# Patient Record
Sex: MALE | HISPANIC OR LATINO | Employment: FULL TIME | URBAN - METROPOLITAN AREA
[De-identification: names, ages, dates, MRNs, and addresses within clinical notes are randomized per-mention and may not be internally consistent; named-entity substitution may affect disease eponyms.]

---

## 2019-08-19 ENCOUNTER — OFFICE VISIT (OUTPATIENT)
Dept: URGENT CARE | Facility: PHYSICIAN GROUP | Age: 24
End: 2019-08-19
Payer: COMMERCIAL

## 2019-08-19 VITALS
HEART RATE: 82 BPM | HEIGHT: 67 IN | TEMPERATURE: 96.6 F | BODY MASS INDEX: 37.67 KG/M2 | OXYGEN SATURATION: 96 % | SYSTOLIC BLOOD PRESSURE: 122 MMHG | RESPIRATION RATE: 18 BRPM | WEIGHT: 240 LBS | DIASTOLIC BLOOD PRESSURE: 84 MMHG

## 2019-08-19 DIAGNOSIS — H10.31 ACUTE BACTERIAL CONJUNCTIVITIS OF RIGHT EYE: ICD-10-CM

## 2019-08-19 PROCEDURE — 99204 OFFICE O/P NEW MOD 45 MIN: CPT | Performed by: NURSE PRACTITIONER

## 2019-08-19 RX ORDER — POLYMYXIN B SULFATE AND TRIMETHOPRIM 1; 10000 MG/ML; [USP'U]/ML
1 SOLUTION OPHTHALMIC 4 TIMES DAILY
Qty: 1 BOTTLE | Refills: 0 | Status: SHIPPED | OUTPATIENT
Start: 2019-08-19 | End: 2019-10-17

## 2019-08-19 NOTE — PROGRESS NOTES
"Subjective:      Atul Michelle is a 23 y.o. male who presents with Redness Or Discharge From Eye (R eye [id number] )            HPI  C/o right eye redness, dryness. Sensation of something in eye. Denies outdoors or wood work. Denies vision. Rinsed with water the eye was watering.     PMH:  has no past medical history on file.  MEDS: No current outpatient medications on file.  ALLERGIES: No Known Allergies  SURGHX: No past surgical history on file.  SOCHX:  reports that he has never smoked. He has never used smokeless tobacco. He reports that he drinks alcohol.  FH: Family history was reviewed, no pertinent findings to report    Review of Systems   Constitutional: Negative for chills, fever and malaise/fatigue.   HENT: Negative for congestion, ear pain, sinus pain and sore throat.    Eyes: Positive for redness. Negative for blurred vision, double vision, photophobia, pain and discharge.   Respiratory: Negative for cough.    Musculoskeletal: Negative for myalgias.   Skin: Negative for itching and rash.   Neurological: Negative for dizziness, weakness and headaches.   Endo/Heme/Allergies: Negative for environmental allergies.   All other systems reviewed and are negative.         Objective:     /84   Pulse 82   Temp 35.9 °C (96.6 °F) (Temporal)   Resp 18   Ht 1.702 m (5' 7\")   Wt 108.9 kg (240 lb)   SpO2 96%   BMI 37.59 kg/m²      Physical Exam   Constitutional: He is oriented to person, place, and time. Vital signs are normal. He appears well-developed and well-nourished. He is active and cooperative.  Non-toxic appearance. He does not have a sickly appearance. He does not appear ill. No distress.   HENT:   Head: Normocephalic.   Nose: Nose normal. No mucosal edema, rhinorrhea or sinus tenderness.   Eyes: Pupils are equal, round, and reactive to light. EOM are normal. Right eye exhibits no chemosis, no discharge, no exudate and no hordeolum. No foreign body (Sclera redness, no eye d/c, no " clear tearing, no foreign material in right eye.) present in the right eye. Left eye exhibits no chemosis, no discharge, no exudate and no hordeolum. No foreign body present in the left eye. Right conjunctiva is injected. Right conjunctiva has no hemorrhage. Left conjunctiva is not injected. Left conjunctiva has no hemorrhage.   Neck: Normal range of motion. Neck supple.   Cardiovascular: Normal rate.   Pulmonary/Chest: Effort normal.   Musculoskeletal: Normal range of motion.   Neurological: He is alert and oriented to person, place, and time.   Skin: Skin is warm and dry. He is not diaphoretic.   Vitals reviewed.              Assessment/Plan:     1. Acute bacterial conjunctivitis of right eye    - polymixin-trimethoprim (POLYTRIM) 73238-1.1 UNIT/ML-% Solution; Place 1 Drop in right eye 4 times a day.  Dispense: 1 Bottle; Refill: 0    May use longer acting antihistamine x 7 days  May use cool compresses for any eye swelling  Avoid touching eyes  May clean eyes with mild dilute soap along eyelash line with eyes closed, rinse with plenty of water  May use saline eye rinse or eye irrigation solution prn for eye dryness  Monitor for increase in redness or swelling, vision change, eye pain- need re-evaluation

## 2019-08-22 ASSESSMENT — ENCOUNTER SYMPTOMS
CHILLS: 0
MYALGIAS: 0
WEAKNESS: 0
DOUBLE VISION: 0
EYE PAIN: 0
SINUS PAIN: 0
EYE REDNESS: 1
PHOTOPHOBIA: 0
SORE THROAT: 0
HEADACHES: 0
DIZZINESS: 0
BLURRED VISION: 0
EYE DISCHARGE: 0
COUGH: 0
FEVER: 0

## 2019-10-17 ENCOUNTER — HOSPITAL ENCOUNTER (OUTPATIENT)
Dept: LAB | Facility: MEDICAL CENTER | Age: 24
End: 2019-10-17
Attending: FAMILY MEDICINE
Payer: COMMERCIAL

## 2019-10-17 ENCOUNTER — OFFICE VISIT (OUTPATIENT)
Dept: MEDICAL GROUP | Facility: PHYSICIAN GROUP | Age: 24
End: 2019-10-17
Payer: COMMERCIAL

## 2019-10-17 VITALS
BODY MASS INDEX: 35.63 KG/M2 | HEIGHT: 67 IN | WEIGHT: 227 LBS | RESPIRATION RATE: 18 BRPM | TEMPERATURE: 97 F | SYSTOLIC BLOOD PRESSURE: 120 MMHG | HEART RATE: 66 BPM | OXYGEN SATURATION: 98 % | DIASTOLIC BLOOD PRESSURE: 80 MMHG

## 2019-10-17 DIAGNOSIS — Z13.6 SCREENING FOR CARDIOVASCULAR CONDITION: ICD-10-CM

## 2019-10-17 DIAGNOSIS — Z23 NEED FOR VACCINATION: ICD-10-CM

## 2019-10-17 DIAGNOSIS — Z00.00 WELL ADULT EXAM: ICD-10-CM

## 2019-10-17 DIAGNOSIS — E66.9 OBESITY (BMI 30-39.9): ICD-10-CM

## 2019-10-17 DIAGNOSIS — Z11.3 SCREEN FOR STD (SEXUALLY TRANSMITTED DISEASE): ICD-10-CM

## 2019-10-17 LAB
C TRACH DNA SPEC QL NAA+PROBE: NEGATIVE
CHOLEST SERPL-MCNC: 132 MG/DL (ref 100–199)
FASTING STATUS PATIENT QL REPORTED: NORMAL
HDLC SERPL-MCNC: 29 MG/DL
HIV 1+2 AB+HIV1 P24 AG SERPL QL IA: NON REACTIVE
LDLC SERPL CALC-MCNC: 63 MG/DL
N GONORRHOEA DNA SPEC QL NAA+PROBE: NEGATIVE
SPECIMEN SOURCE: NORMAL
TREPONEMA PALLIDUM IGG+IGM AB [PRESENCE] IN SERUM OR PLASMA BY IMMUNOASSAY: NON REACTIVE
TRIGL SERPL-MCNC: 201 MG/DL (ref 0–149)

## 2019-10-17 PROCEDURE — 99385 PREV VISIT NEW AGE 18-39: CPT | Mod: 25 | Performed by: FAMILY MEDICINE

## 2019-10-17 PROCEDURE — 90472 IMMUNIZATION ADMIN EACH ADD: CPT | Performed by: FAMILY MEDICINE

## 2019-10-17 PROCEDURE — 90651 9VHPV VACCINE 2/3 DOSE IM: CPT | Performed by: FAMILY MEDICINE

## 2019-10-17 PROCEDURE — 86780 TREPONEMA PALLIDUM: CPT

## 2019-10-17 PROCEDURE — 87591 N.GONORRHOEAE DNA AMP PROB: CPT

## 2019-10-17 PROCEDURE — 87491 CHLMYD TRACH DNA AMP PROBE: CPT

## 2019-10-17 PROCEDURE — 90715 TDAP VACCINE 7 YRS/> IM: CPT | Performed by: FAMILY MEDICINE

## 2019-10-17 PROCEDURE — 36415 COLL VENOUS BLD VENIPUNCTURE: CPT

## 2019-10-17 PROCEDURE — 90686 IIV4 VACC NO PRSV 0.5 ML IM: CPT | Performed by: FAMILY MEDICINE

## 2019-10-17 PROCEDURE — 87389 HIV-1 AG W/HIV-1&-2 AB AG IA: CPT

## 2019-10-17 PROCEDURE — 90471 IMMUNIZATION ADMIN: CPT | Performed by: FAMILY MEDICINE

## 2019-10-17 PROCEDURE — 80061 LIPID PANEL: CPT

## 2019-10-17 SDOH — HEALTH STABILITY: MENTAL HEALTH: HOW OFTEN DO YOU HAVE A DRINK CONTAINING ALCOHOL?: 2-4 TIMES A MONTH

## 2019-10-17 ASSESSMENT — PATIENT HEALTH QUESTIONNAIRE - PHQ9: CLINICAL INTERPRETATION OF PHQ2 SCORE: 0

## 2019-10-17 NOTE — PROGRESS NOTES
"cc: annual exam       Subjective:     Atul Michelle is a 23 y.o. male presenting for the following:     Patient feels well today and presents in his normal state of health.    He takes no regular medications.  No hospitalizations or surgeries in his past.  No family history of early death or early heart attack/stroke.  No family history of cancers.    Patient denies any chest pain, shortness of breath, palpitations, swelling, or lightheadedness.      Review of systems:  All others reviewed and are negative.     No current outpatient medications on file.    Allergies, past medical history, past surgical history, family history, social history reviewed and updated    Objective:     Vitals: /80 (BP Location: Right arm, Patient Position: Sitting, BP Cuff Size: Large adult)   Pulse 66   Temp 36.1 °C (97 °F) (Temporal)   Resp 18   Ht 1.702 m (5' 7\")   Wt 103 kg (227 lb)   SpO2 98%   BMI 35.55 kg/m²   General: Alert, pleasant, NAD  HEENT: Normocephalic.   EOMI, no icterus or pallor.  Conjunctivae and lids normal. External ears normal. Oropharynx non-erythematous, mucous membranes moist.    Neck supple.  No thyromegaly or masses palpated. No cervical or supraclavicular lymphadenopathy.  Heart: Regular rate and rhythm.  S1 and S2 normal.  No murmurs appreciated.  Respiratory: Normal respiratory effort.  Clear to auscultation bilaterally.  Abdomen: Non-distended, soft  Skin: Warm, dry, no rashes.  Musculoskeletal: Gait is normal.  Moves all extremities well.  Extremities: No leg edema.    Neurological: No tremors, CN2-12 grossly intact  Psych:  Affect is normal, judgement is good, memory is intact, grooming is appropriate.    Assessment/Plan:     Atul was seen today for establish care.    Diagnoses and all orders for this visit:    Well adult exam    Screen for STD (sexually transmitted disease): Patient is sexually active with women.  Asymptomatic recommend screening yearly for STD.  -     HIV AG/AB " COMBO ASSAY SCREENING; Future  -     T.PALLIDUM AB EIA; Future  -     Chlamydia/GC PCR Urine Or Swab; Future    Screening for cardiovascular condition: Never with screening for high cholesterol in his lifetime.  So we will check this now.  -     Lipid Profile; Future    Need for vaccination: Last Tdap more than 10 years ago.  Patient only with one dose of HPV vaccine on file.  No history of allergic reactions to vaccinations and he does agree to get the second dose today.   -     Influenza Vaccine Quad Injection (PF)  -     Tdap Vaccine =>8YO IM  -     9VHPV Vaccine 2-3 Dose IM    Obesity (BMI 30-39.9): No history of diabetes or high cholesterol.      Return in about 1 year (around 10/17/2020), or if symptoms worsen or fail to improve.

## 2019-10-18 DIAGNOSIS — Z13.1 SCREENING FOR DIABETES MELLITUS: ICD-10-CM

## 2019-10-18 DIAGNOSIS — E78.1 HYPERTRIGLYCERIDEMIA: ICD-10-CM

## 2019-10-18 DIAGNOSIS — E66.9 OBESITY (BMI 30-39.9): ICD-10-CM

## 2019-10-18 NOTE — PROGRESS NOTES
Patient with hypertriglyceridemia on blood test.  Will check CMP to ensure no transaminitis/fatty liver disease as well as to check fasting blood sugar.  We will also check hemoglobin A1c to ensure no signs of prediabetes or diabetes.

## 2019-11-19 ENCOUNTER — APPOINTMENT (OUTPATIENT)
Dept: MEDICAL GROUP | Facility: PHYSICIAN GROUP | Age: 24
End: 2019-11-19
Payer: COMMERCIAL

## 2020-02-18 ENCOUNTER — NON-PROVIDER VISIT (OUTPATIENT)
Dept: MEDICAL GROUP | Facility: PHYSICIAN GROUP | Age: 25
End: 2020-02-18
Payer: COMMERCIAL

## 2020-02-18 DIAGNOSIS — Z23 NEED FOR VACCINATION: ICD-10-CM

## 2020-02-18 PROCEDURE — 90651 9VHPV VACCINE 2/3 DOSE IM: CPT | Performed by: PHYSICIAN ASSISTANT

## 2020-02-18 PROCEDURE — 90471 IMMUNIZATION ADMIN: CPT | Performed by: PHYSICIAN ASSISTANT

## 2020-02-18 NOTE — NON-PROVIDER
"Atul Burroughs is a 24 y.o. male here for a non-provider visit for:   HPV 3 of 3    Reason for immunization: Overdue/Provider Recommended  Immunization records indicate need for vaccine: Yes, confirmed with Epic  Minimum interval has been met for this vaccine: Yes  ABN completed: Not Indicated    Order and dose verified by: Fabiola TOM Dated 10/30/2019 was given to patient: Yes  All IAC Questionnaire questions were answered \"No.\"    Patient tolerated injection and no adverse effects were observed or reported: Yes    Pt scheduled for next dose in series: Not Indicated    "

## 2020-08-11 ENCOUNTER — OFFICE VISIT (OUTPATIENT)
Dept: MEDICAL GROUP | Facility: PHYSICIAN GROUP | Age: 25
End: 2020-08-11
Payer: COMMERCIAL

## 2020-08-11 VITALS
RESPIRATION RATE: 16 BRPM | DIASTOLIC BLOOD PRESSURE: 82 MMHG | HEIGHT: 67 IN | TEMPERATURE: 97.6 F | SYSTOLIC BLOOD PRESSURE: 114 MMHG | OXYGEN SATURATION: 95 % | HEART RATE: 64 BPM | WEIGHT: 229.8 LBS | BODY MASS INDEX: 36.07 KG/M2

## 2020-08-11 DIAGNOSIS — Z00.00 GENERAL MEDICAL EXAM: ICD-10-CM

## 2020-08-11 DIAGNOSIS — E78.1 HYPERTRIGLYCERIDEMIA: ICD-10-CM

## 2020-08-11 DIAGNOSIS — R22.9 SKIN NODULE: ICD-10-CM

## 2020-08-11 DIAGNOSIS — E55.9 VITAMIN D DEFICIENCY: ICD-10-CM

## 2020-08-11 PROCEDURE — 99213 OFFICE O/P EST LOW 20 MIN: CPT | Performed by: PHYSICIAN ASSISTANT

## 2020-08-11 ASSESSMENT — PATIENT HEALTH QUESTIONNAIRE - PHQ9: CLINICAL INTERPRETATION OF PHQ2 SCORE: 0

## 2020-08-11 NOTE — ASSESSMENT & PLAN NOTE
Due for updated labs.  Patient is taking multivitamin.  Recommend the patient look at the dosage and increase to 2000 IUs daily.

## 2020-08-11 NOTE — ASSESSMENT & PLAN NOTE
Patient admits that his diet can be quite poor.  He has good days and bad days.  Discussed today ways to try to improve diet.  Recommend avoiding fast food.  Recommending reducing animal fats, specifically help his triglyceride level.  He is exercising 3-5 times a week.  He states he is not always compliant with this.  Recommend he really try to work on lifestyle modifications over the next 90 days before he gets his annual labs.

## 2020-08-11 NOTE — ASSESSMENT & PLAN NOTE
Noticed this week, on Saturday some bumps around anus. No pain or itching. Have not changed or enlarged since Saturday. Normal bowel movements, no blood in stool. No penile issues. Urination is normal. No history of same.

## 2020-08-11 NOTE — PROGRESS NOTES
CC:   Chief Complaint   Patient presents with   • Establish Care   • Skin Lesion          HISTORY OF PRESENT ILLNESS: Patient is a 24 y.o. male established patient who presents today to discuss the following issues:     Health Maintenance: Completed    Skin nodule  Noticed this week, on Saturday some bumps around anus. No pain or itching. Have not changed or enlarged since Saturday. Normal bowel movements, no blood in stool. No penile issues. Urination is normal. No history of same.     Hypertriglyceridemia  This is a chronic condition.   Latest Labs:   Lab Results   Component Value Date/Time    CHOLSTRLTOT 132 10/17/2019 08:52 AM    LDL 63 10/17/2019 08:52 AM    HDL 29 (A) 10/17/2019 08:52 AM    TRIGLYCERIDE 201 (H) 10/17/2019 08:52 AM      Medications: none  Diet/Exercise: good days and bad days on diet. Exercises 3-5 times a week.   Family History of high cholesterol or heart disease? no      Vitamin D deficiency  Due for updated labs.  Patient is taking multivitamin.  Recommend the patient look at the dosage and increase to 2000 IUs daily.      Patient Active Problem List    Diagnosis Date Noted   • Skin nodule 08/11/2020   • Vitamin D deficiency 08/11/2020   • Hypertriglyceridemia 10/18/2019   • Obesity (BMI 30-39.9) 10/17/2019      Allergies:Patient has no known allergies.    No current outpatient medications on file.     No current facility-administered medications for this visit.        Social History     Tobacco Use   • Smoking status: Never Smoker   • Smokeless tobacco: Never Used   Substance Use Topics   • Alcohol use: Yes     Frequency: 2-4 times a month     Comment: 2 drinks per week.    • Drug use: Yes     Types: Marijuana     Comment: occasionally      Social History     Social History Narrative   • Not on file       History reviewed. No pertinent family history.    Review of Systems:    Constitutional: No Fevers, Chills  Eyes: No vision changes  ENT: No hearing changes  Resp: No Shortness of  "breath  CV: No Chest pain  GI: No Nausea/Vomiting  MSK: No weakness  Skin: No rashes, c/o anal lesion, see HPI.   Neuro: No Headaches  Psych: No Suicidal ideations    All remaining systems reviewed and found to be negative, except as stated above.    Exam:    /82 (BP Location: Right arm, Patient Position: Sitting, BP Cuff Size: Adult)   Pulse 64   Temp 36.4 °C (97.6 °F) (Temporal)   Resp 16   Ht 1.702 m (5' 7\")   Wt 104.2 kg (229 lb 12.8 oz)   SpO2 95%  Body mass index is 35.99 kg/m².    General:  Well nourished, well developed male in NAD  HENT: Atraumatic, normocephalic  EYES: Extraocular movements intact, pupils equal and reactive to light  NECK: Supple, FROM  CHEST: No deformities, Equal chest expansion  RESP: Unlabored, no stridor or audible wheeze  HEART: Regular Rate and rhythm.   ABD: Soft, Nontender, Non-Distended  Extremities: No Clubbing, Cyanosis, or Edema  Skin: Warm/dry, without rashes, After receiving verbal consent from the patient performed an anal exam.  Normal-appearing anus with no obvious skin lesions, no fissures, no nodules palpated.  No protrusions from the anal sphincter. No erythema.   Neuro: A/O x 4, due to COVID-19- did not have patient remove face mask to test cranial nerves.  Motor/sensory grossly intact  Psych: Normal behavior, normal affect      Lab review:  Labs are reviewed and discussed with a patient  Lab Results   Component Value Date/Time    CHOLSTRLTOT 132 10/17/2019 08:52 AM    LDL 63 10/17/2019 08:52 AM    HDL 29 (A) 10/17/2019 08:52 AM    TRIGLYCERIDE 201 (H) 10/17/2019 08:52 AM       No results found for: SODIUM, POTASSIUM, CHLORIDE, CO2, GLUCOSE, BUN, CREATININE, BUNCREATRAT, GLOMRATE  No results found for: ALKPHOSPHAT, ASTSGOT, ALTSGPT, TBILIRUBIN   No results found for: HBA1C  No results found for: TSH  No results found for: FREET4    No results found for: WBC, RBC, HEMOGLOBIN, HEMATOCRIT, MCV, MCH, MCHC, MPV, NEUTSPOLYS, LYMPHOCYTES, MONOCYTES, EOSINOPHILS, " BASOPHILS, HYPOCHROMIA, ANISOCYTOSIS       Assessment/Plan:  1. Skin nodule  Normal appearing anal exam today. Suspect from his history could have been cyst. No evidence of pilonidal cyst or infected cyst on exam. No evidence of hemmorroids, fissures, verrucous type lesions on exam as well. Recommend if nodules change, grow in size, become painful let me know right away for re-assessment.   2. Hypertriglyceridemia  Chronic condition.  Recommended lifestyle modifications.  We discussed today improving diet, reducing red meats, narayan, sausage, pork, avoiding fast food.  Increasing fiber to 35 g a day.  Cardiovascular exercise 30 minutes for 5 times a week.  3. General medical exam  - CBC WITH DIFFERENTIAL; Future  - Comp Metabolic Panel; Future  - Lipid Profile; Future  - TSH; Future    4. Vitamin D deficiency  - VITAMIN D,25 HYDROXY; Future  He supplementing multivitamin, recommend vitamin D 2000 IUs a day.  5. BMI 35.0-35.9, adult   Patient admits that his diet can be quite poor.  He has good days and bad days.  Discussed today ways to try to improve diet.  Recommend avoiding fast food.  Recommending reducing animal fats, specifically help his triglyceride level.  He is exercising 3-5 times a week.  He states he is not always compliant with this.  Recommend he really try to work on lifestyle modifications over the next 90 days before he gets his annual labs.       Follow-up: Return in about 3 months (around 11/11/2020) for Follow up on labs.    Please note that this dictation was created using voice recognition software. I have made every reasonable attempt to correct obvious errors, but I expect that there are errors of grammar and possibly content that I did not discover before finalizing the note.

## 2020-08-11 NOTE — ASSESSMENT & PLAN NOTE
This is a chronic condition.   Latest Labs:   Lab Results   Component Value Date/Time    CHOLSTRLTOT 132 10/17/2019 08:52 AM    LDL 63 10/17/2019 08:52 AM    HDL 29 (A) 10/17/2019 08:52 AM    TRIGLYCERIDE 201 (H) 10/17/2019 08:52 AM      Medications: none  Diet/Exercise: good days and bad days on diet. Exercises 3-5 times a week.   Family History of high cholesterol or heart disease? no

## 2020-08-19 ENCOUNTER — TELEPHONE (OUTPATIENT)
Dept: MEDICAL GROUP | Facility: PHYSICIAN GROUP | Age: 25
End: 2020-08-19

## 2020-08-19 LAB
25(OH)D3+25(OH)D2 SERPL-MCNC: 19 NG/ML (ref 30–100)
ALBUMIN SERPL-MCNC: 4.6 G/DL (ref 4.1–5.2)
ALBUMIN/GLOB SERPL: 1.5 {RATIO} (ref 1.2–2.2)
ALP SERPL-CCNC: 79 IU/L (ref 39–117)
ALT SERPL-CCNC: 16 IU/L (ref 0–44)
AST SERPL-CCNC: 13 IU/L (ref 0–40)
BASOPHILS # BLD AUTO: 0.1 X10E3/UL (ref 0–0.2)
BASOPHILS NFR BLD AUTO: 1 %
BILIRUB SERPL-MCNC: 0.4 MG/DL (ref 0–1.2)
BUN SERPL-MCNC: 16 MG/DL (ref 6–20)
BUN/CREAT SERPL: 14 (ref 9–20)
CALCIUM SERPL-MCNC: 9.5 MG/DL (ref 8.7–10.2)
CHLORIDE SERPL-SCNC: 101 MMOL/L (ref 96–106)
CHOLEST SERPL-MCNC: 202 MG/DL (ref 100–199)
CO2 SERPL-SCNC: 23 MMOL/L (ref 20–29)
CREAT SERPL-MCNC: 1.14 MG/DL (ref 0.76–1.27)
EOSINOPHIL # BLD AUTO: 0.2 X10E3/UL (ref 0–0.4)
EOSINOPHIL NFR BLD AUTO: 2 %
ERYTHROCYTE [DISTWIDTH] IN BLOOD BY AUTOMATED COUNT: 13.2 % (ref 11.6–15.4)
GLOBULIN SER CALC-MCNC: 3 G/DL (ref 1.5–4.5)
GLUCOSE SERPL-MCNC: 94 MG/DL (ref 65–99)
HCT VFR BLD AUTO: 52.9 % (ref 37.5–51)
HDLC SERPL-MCNC: 34 MG/DL
HGB BLD-MCNC: 17.8 G/DL (ref 13–17.7)
IMM GRANULOCYTES # BLD AUTO: 0.1 X10E3/UL (ref 0–0.1)
IMM GRANULOCYTES NFR BLD AUTO: 1 %
IMMATURE CELLS  115398: ABNORMAL
LABORATORY COMMENT REPORT: ABNORMAL
LDLC SERPL CALC-MCNC: ABNORMAL MG/DL (ref 0–99)
LYMPHOCYTES # BLD AUTO: 2.3 X10E3/UL (ref 0.7–3.1)
LYMPHOCYTES NFR BLD AUTO: 23 %
MCH RBC QN AUTO: 27.9 PG (ref 26.6–33)
MCHC RBC AUTO-ENTMCNC: 33.6 G/DL (ref 31.5–35.7)
MCV RBC AUTO: 83 FL (ref 79–97)
MONOCYTES # BLD AUTO: 0.6 X10E3/UL (ref 0.1–0.9)
MONOCYTES NFR BLD AUTO: 6 %
MORPHOLOGY BLD-IMP: ABNORMAL
NEUTROPHILS # BLD AUTO: 6.7 X10E3/UL (ref 1.4–7)
NEUTROPHILS NFR BLD AUTO: 67 %
NRBC BLD AUTO-RTO: ABNORMAL %
PLATELET # BLD AUTO: 221 X10E3/UL (ref 150–450)
POTASSIUM SERPL-SCNC: 4.3 MMOL/L (ref 3.5–5.2)
PROT SERPL-MCNC: 7.6 G/DL (ref 6–8.5)
RBC # BLD AUTO: 6.38 X10E6/UL (ref 4.14–5.8)
SODIUM SERPL-SCNC: 139 MMOL/L (ref 134–144)
TRIGL SERPL-MCNC: 572 MG/DL (ref 0–149)
TSH SERPL DL<=0.005 MIU/L-ACNC: 1.51 UIU/ML (ref 0.45–4.5)
VLDLC SERPL CALC-MCNC: ABNORMAL MG/DL (ref 5–40)
WBC # BLD AUTO: 9.9 X10E3/UL (ref 3.4–10.8)

## 2020-08-19 NOTE — TELEPHONE ENCOUNTER
----- Message from Yuridia Alvarado P.A.-C. sent at 8/19/2020  3:55 PM PDT -----  Please call patient about their results.     Results showed:     Your labs look like you may have been a little bit dehydrated.  Would probably repeat making sure you drink ample water beforehand.    Your cholesterol is significantly elevated.  Triglycerides were at 572 and they were unable to calculate LDL because it is so high.  With this it would be my strong recommendation that we start a medication for you.  The medication I would like to try is called TriCor.  Most common side effects can be muscle aches and fatigue.  If the patient is agreeable I will call in a prescription for this medication.     I also recommend decreasing your intake of saturated fats which are found in meats that come from a cow or pig. Saturated fats are also found in creams, cheeses, butter, mayonnaise, and fried foods. I recommend that you try to eat more vegetables, fruits, fish, and healthy oils like olive oil. Increase fiber intake to 35 grams or more a day. If you do not eat a lot of fiber currently, increase fiber slowly over weeks to reduce bloating and abdominal discomfort. I also recommend moderate intensity exercise like a brisk walk. This exercise should last at least 30 minutes and occur 5 or more days per week.       Vitamin D is low.  Please start supplementing vitamin D 2000 IUs daily.    If you have any questions or concerns, do not hesitate to contact me or my Medical Assistant. Thank you for your time today.     Respectfully,     Yuridia Alvarado PA-C

## 2020-08-20 DIAGNOSIS — E78.1 HYPERTRIGLYCERIDEMIA: ICD-10-CM

## 2020-08-20 RX ORDER — FENOFIBRATE 48 MG/1
48 TABLET, COATED ORAL DAILY
Qty: 90 TAB | Refills: 1 | Status: SHIPPED | OUTPATIENT
Start: 2020-08-20 | End: 2021-05-19

## 2020-12-15 NOTE — ASSESSMENT & PLAN NOTE
This is a  chronic condition.   Supplementation: Continue 2000 IU daily  Last Vitamin D level:   VIT D:   Lab Results   Component Value Date/Time    25HYDROXY 19.0 (L) 08/18/2020 0747     Patient denies any muscle aches or fatigue.

## 2020-12-15 NOTE — ASSESSMENT & PLAN NOTE
This is a chronic condition.   Latest Labs:   Lab Results   Component Value Date/Time    CHOLSTRLTOT 202 (H) 08/18/2020 07:47 AM    CHOLSTRLTOT 132 10/17/2019 08:52 AM    LDL Comment 08/18/2020 07:47 AM    LDL 63 10/17/2019 08:52 AM    HDL 34 (L) 08/18/2020 07:47 AM    HDL 29 (A) 10/17/2019 08:52 AM    TRIGLYCERIDE 572 (HH) 08/18/2020 07:47 AM    TRIGLYCERIDE 201 (H) 10/17/2019 08:52 AM      Medications: started him on Tricor  Medication side effects: none  Diet/Exercise: Same as before, working progress.     Pending repeat labs, he will go get them done.

## 2020-12-16 ENCOUNTER — TELEMEDICINE (OUTPATIENT)
Dept: MEDICAL GROUP | Facility: PHYSICIAN GROUP | Age: 25
End: 2020-12-16
Payer: COMMERCIAL

## 2020-12-16 VITALS — BODY MASS INDEX: 35.94 KG/M2 | WEIGHT: 229 LBS | HEIGHT: 67 IN | RESPIRATION RATE: 12 BRPM

## 2020-12-16 DIAGNOSIS — E78.1 HYPERTRIGLYCERIDEMIA: ICD-10-CM

## 2020-12-16 DIAGNOSIS — R79.89 ABNORMAL CBC: ICD-10-CM

## 2020-12-16 DIAGNOSIS — E55.9 VITAMIN D DEFICIENCY: ICD-10-CM

## 2020-12-16 PROCEDURE — 99213 OFFICE O/P EST LOW 20 MIN: CPT | Mod: 95,CR | Performed by: PHYSICIAN ASSISTANT

## 2020-12-16 ASSESSMENT — FIBROSIS 4 INDEX: FIB4 SCORE: 0.38

## 2020-12-16 NOTE — ASSESSMENT & PLAN NOTE
Lab Results   Component Value Date/Time    WBC 9.9 08/18/2020 07:47 AM    RBC 6.38 (H) 08/18/2020 07:47 AM    HEMOGLOBIN 17.8 (H) 08/18/2020 07:47 AM    HEMATOCRIT 52.9 (H) 08/18/2020 07:47 AM    MCV 83 08/18/2020 07:47 AM    MCH 27.9 08/18/2020 07:47 AM    MCHC 33.6 08/18/2020 07:47 AM    RDW 13.2 08/18/2020 07:47 AM    PLATELETCT 221 08/18/2020 07:47 AM    NEUTSPOLYS 67 08/18/2020 07:47 AM    LYMPHOCYTES 23 08/18/2020 07:47 AM    MONOCYTES 6 08/18/2020 07:47 AM    EOSINOPHILS 2 08/18/2020 07:47 AM    BASOPHILS 1 08/18/2020 07:47 AM    IMMGRAN 1 08/18/2020 07:47 AM    NRBC CANCELED 08/18/2020 07:47 AM    NEUTS 6.7 08/18/2020 07:47 AM    LYMPHS 2.3 08/18/2020 07:47 AM    MONOS 0.6 08/18/2020 07:47 AM    EOS 0.2 08/18/2020 07:47 AM    BASO 0.1 08/18/2020 07:47 AM    IMMGRANAB 0.1 08/18/2020 07:47 AM   ]  Doesn't really snore. No tobacco. No waking gasping for breath. He may have been dehydrated before last labs.

## 2020-12-16 NOTE — PROGRESS NOTES
Virtual Visit: Established Patient   This visit was conducted via Zoom using secure and encrypted videoconferencing technology. The patient was in a private location in the state of [place].    The patient's identity was confirmed and verbal consent was obtained for this virtual visit.    Subjective:   CC:   Chief Complaint   Patient presents with   • Follow-Up   • Hyperlipidemia       Atul Burroughs is a 25 y.o. male presenting for evaluation and management of:      Vitamin D deficiency  This is a  chronic condition.   Supplementation: Continue 2000 IU daily  Last Vitamin D level:   VIT D:   Lab Results   Component Value Date/Time    25HYDROXY 19.0 (L) 08/18/2020 0747     Patient denies any muscle aches or fatigue.       Hypertriglyceridemia  This is a chronic condition.   Latest Labs:   Lab Results   Component Value Date/Time    CHOLSTRLTOT 202 (H) 08/18/2020 07:47 AM    CHOLSTRLTOT 132 10/17/2019 08:52 AM    LDL Comment 08/18/2020 07:47 AM    LDL 63 10/17/2019 08:52 AM    HDL 34 (L) 08/18/2020 07:47 AM    HDL 29 (A) 10/17/2019 08:52 AM    TRIGLYCERIDE 572 (HH) 08/18/2020 07:47 AM    TRIGLYCERIDE 201 (H) 10/17/2019 08:52 AM      Medications: started him on Tricor  Medication side effects: none  Diet/Exercise: Same as before, working progress.     Pending repeat labs, he will go get them done.       Abnormal CBC  Lab Results   Component Value Date/Time    WBC 9.9 08/18/2020 07:47 AM    RBC 6.38 (H) 08/18/2020 07:47 AM    HEMOGLOBIN 17.8 (H) 08/18/2020 07:47 AM    HEMATOCRIT 52.9 (H) 08/18/2020 07:47 AM    MCV 83 08/18/2020 07:47 AM    MCH 27.9 08/18/2020 07:47 AM    MCHC 33.6 08/18/2020 07:47 AM    RDW 13.2 08/18/2020 07:47 AM    PLATELETCT 221 08/18/2020 07:47 AM    NEUTSPOLYS 67 08/18/2020 07:47 AM    LYMPHOCYTES 23 08/18/2020 07:47 AM    MONOCYTES 6 08/18/2020 07:47 AM    EOSINOPHILS 2 08/18/2020 07:47 AM    BASOPHILS 1 08/18/2020 07:47 AM    IMMGRAN 1 08/18/2020 07:47 AM    NRBC CANCELED 08/18/2020  "07:47 AM    NEUTS 6.7 08/18/2020 07:47 AM    LYMPHS 2.3 08/18/2020 07:47 AM    MONOS 0.6 08/18/2020 07:47 AM    EOS 0.2 08/18/2020 07:47 AM    BASO 0.1 08/18/2020 07:47 AM    IMMGRANAB 0.1 08/18/2020 07:47 AM   ]  Doesn't really snore. No tobacco. No waking gasping for breath. He may have been dehydrated before last labs.      ROS   Denies any recent fevers or chills. No nausea or vomiting. No chest pains or shortness of breath.     No Known Allergies    Current medicines (including changes today)  Current Outpatient Medications   Medication Sig Dispense Refill   • fenofibrate (TRICOR) 48 MG Tab Take 1 Tab by mouth every day. 90 Tab 1     No current facility-administered medications for this visit.        Patient Active Problem List    Diagnosis Date Noted   • Abnormal CBC 12/16/2020   • Skin nodule 08/11/2020   • Vitamin D deficiency 08/11/2020   • Hypertriglyceridemia 10/18/2019   • BMI 35.0-35.9,adult 10/17/2019       History reviewed. No pertinent family history.    He  has no past medical history of Encounter for long-term (current) use of other medications.  He  has no past surgical history on file.       Objective:   Resp 12   Ht 1.702 m (5' 7\")   Wt 103.9 kg (229 lb)   BMI 35.87 kg/m²     Physical Exam:  Constitutional: Alert, no distress, well-groomed.  Skin: No rashes in visible areas.  Eye: Round. Conjunctiva clear, lids normal. No icterus.   ENMT: Lips pink without lesions, good dentition, moist mucous membranes. Phonation normal.  Neck: No masses, no thyromegaly. Moves freely without pain.  Respiratory: Unlabored respiratory effort, no cough or audible wheeze  Psych: Alert and oriented x3, normal affect and mood.       Assessment and Plan:   The following treatment plan was discussed:     1. Vitamin D deficiency  Continue vitamin D supplementation 2000 IUs a day, will repeat labs in a year.  2. Hypertriglyceridemia  Continue TriCor 48 mg daily, patient tolerating without side effect. Due for updated " labs. Orders entered. Patient will get them when he can. Discussed with patient we may go up on the TriCor if needed, also if his cholesterol panel indicates elevated LDL in addition to triglycerides may suggest switching to a statin, explained that this might be more therapeutic for him. Will await his lab results.  3. Abnormal CBC  - CBC WITH DIFFERENTIAL; Future  Patient may have been dehydrated before his last set of blood work, no sleep apnea symptoms, does not smoke tobacco products. Will repeat with next labs. If still elevated would be a consideration to get a sleep study. He will drink plenty of water before his next set of labs.      Follow-up: Return in about 3 months (around 3/16/2021) for Follow up on labs.        The patient verbalized agreement and understanding of current plan. All questions and concerns were addressed at time of visit.    Please note that this dictation was created using voice recognition software. I have made every reasonable attempt to correct obvious errors, but I expect that there are errors of grammar and possibly content that I did not discover before finalizing the note.

## 2021-04-12 ENCOUNTER — OFFICE VISIT (OUTPATIENT)
Dept: URGENT CARE | Facility: PHYSICIAN GROUP | Age: 26
End: 2021-04-12
Payer: COMMERCIAL

## 2021-04-12 VITALS
WEIGHT: 235 LBS | RESPIRATION RATE: 16 BRPM | SYSTOLIC BLOOD PRESSURE: 130 MMHG | BODY MASS INDEX: 36.88 KG/M2 | DIASTOLIC BLOOD PRESSURE: 90 MMHG | HEIGHT: 67 IN | TEMPERATURE: 97.7 F | OXYGEN SATURATION: 99 % | HEART RATE: 83 BPM

## 2021-04-12 DIAGNOSIS — H10.30 ACUTE CONJUNCTIVITIS, UNSPECIFIED ACUTE CONJUNCTIVITIS TYPE, UNSPECIFIED LATERALITY: ICD-10-CM

## 2021-04-12 PROCEDURE — 99214 OFFICE O/P EST MOD 30 MIN: CPT | Performed by: PHYSICIAN ASSISTANT

## 2021-04-12 RX ORDER — OFLOXACIN 3 MG/ML
1 SOLUTION/ DROPS OPHTHALMIC 4 TIMES DAILY
Qty: 5 ML | Refills: 0 | Status: SHIPPED | OUTPATIENT
Start: 2021-04-12 | End: 2021-04-17

## 2021-04-12 RX ORDER — PREDNISOLONE ACETATE 10 MG/ML
1 SUSPENSION/ DROPS OPHTHALMIC 4 TIMES DAILY
Qty: 5 ML | Refills: 0 | Status: SHIPPED | OUTPATIENT
Start: 2021-04-12 | End: 2021-04-17

## 2021-04-12 ASSESSMENT — ENCOUNTER SYMPTOMS
CHILLS: 0
SORE THROAT: 0
DOUBLE VISION: 0
PALPITATIONS: 0
HEADACHES: 0
SHORTNESS OF BREATH: 0
COUGH: 0
BLURRED VISION: 0
EYE DISCHARGE: 1
EYE REDNESS: 1
FEVER: 0
EYE PAIN: 1
SINUS PAIN: 0
PHOTOPHOBIA: 0

## 2021-04-12 ASSESSMENT — VISUAL ACUITY: OU: 1

## 2021-04-12 ASSESSMENT — FIBROSIS 4 INDEX: FIB4 SCORE: 0.38

## 2021-04-12 NOTE — PROGRESS NOTES
"Subjective:   Atul Burroughs is a 25 y.o. male who presents for Eye Pain (R eye outter corner [id number] )      Eye Problem   The right eye is affected. This is a new problem. The current episode started in the past 7 days. The problem occurs constantly. The problem has been unchanged. There was no injury mechanism. The pain is moderate. There is no known exposure to pink eye. He does not wear contacts. Associated symptoms include an eye discharge and eye redness. Pertinent negatives include no blurred vision, double vision, fever or photophobia. He has tried commercial eye wash for the symptoms. The treatment provided no relief.       Review of Systems   Constitutional: Negative for chills, fever and malaise/fatigue.   HENT: Negative for congestion, ear pain, sinus pain and sore throat.    Eyes: Positive for pain, discharge and redness. Negative for blurred vision, double vision and photophobia.   Respiratory: Negative for cough and shortness of breath.    Cardiovascular: Negative for chest pain and palpitations.   Skin: Negative for rash.   Neurological: Negative for headaches.   All other systems reviewed and are negative.      Medications:    • fenofibrate Tabs    Allergies: Patient has no known allergies.    Problem List: Atul Burroughs has BMI 35.0-35.9,adult; Hypertriglyceridemia; Skin nodule; Vitamin D deficiency; and Abnormal CBC on their problem list.    Surgical History:  No past surgical history on file.    Past Social Hx: Atul Burroughs  reports that he has never smoked. He has never used smokeless tobacco. He reports current alcohol use. He reports current drug use. Drug: Marijuana.     Past Family Hx:  Atul Burroughs family history is not on file.     Problem list, medications, and allergies reviewed by myself today in Epic.     Objective:     Blood Pressure 130/90   Pulse 83   Temperature 36.5 °C (97.7 °F) (Temporal)   Respiration 16   Height 1.702 m (5' 7\")   " Weight 107 kg (235 lb)   Oxygen Saturation 99%   Body Mass Index 36.81 kg/m²     Physical Exam  Vitals reviewed.   Constitutional:       General: He is not in acute distress.     Appearance: He is well-developed. He is not ill-appearing or toxic-appearing.   HENT:      Head: Normocephalic and atraumatic.      Right Ear: Hearing, tympanic membrane, ear canal and external ear normal.      Left Ear: Hearing, tympanic membrane, ear canal and external ear normal.      Nose: Nose normal.      Mouth/Throat:      Pharynx: Uvula midline. No oropharyngeal exudate.   Eyes:      General: Lids are normal. Lids are everted, no foreign bodies appreciated. Vision grossly intact. Gaze aligned appropriately.         Right eye: No foreign body, discharge or hordeolum.      Extraocular Movements: Extraocular movements intact.      Conjunctiva/sclera:      Right eye: Right conjunctiva is injected. No chemosis, exudate or hemorrhage.     Pupils: Pupils are equal, round, and reactive to light.      Right eye: No corneal abrasion or fluorescein uptake.   Cardiovascular:      Rate and Rhythm: Regular rhythm.      Heart sounds: Normal heart sounds. No murmur. No friction rub. No gallop.    Pulmonary:      Effort: Pulmonary effort is normal. No respiratory distress.      Breath sounds: Normal breath sounds. No decreased breath sounds, wheezing or rales.   Chest:      Chest wall: No tenderness.   Musculoskeletal:         General: No tenderness or deformity. Normal range of motion.      Cervical back: Full passive range of motion without pain, normal range of motion and neck supple.   Skin:     General: Skin is warm and dry.      Findings: No rash.   Neurological:      Mental Status: He is alert and oriented to person, place, and time.   Psychiatric:         Speech: Speech normal.         Behavior: Behavior normal.         Thought Content: Thought content normal.         Judgment: Judgment normal.         Assessment/Plan:     Medical Decision  Making/Comments      Pt is a 25 yr old male who presents for evaluation of a painful red eye.  Pt states 4 days of painful red eye-contributes to looking at a monitor for 10+ hrs.  Pt denies change in vision, headache, vomiting, or contact lens use.  Vision acuity: unchanged. Affected eye is injected with visible watery discharge.  Affected eye shows pupils equal and reactive to light.  Cornea is clear without haziness or blood.  Full extraocular motion without pain. There is some pain with light.  There is no edema, erythema or tenderness of the surrounding orbit.  No valerio uptake or FB seen.  Symptoms, exam, and visual acuity suggest possible iritis.  He will follow up with his eye doctor in 24-48 hrs if worsening.    Diagnosis differential includes, but not limited to:     Red eye: conjunctivitis, corneal abrasion, corneal ulcer, ophthalmitis, glaucoma, keratitis, iritis, scleritis         Diagnosis and associated orders     1. Acute conjunctivitis, unspecified acute conjunctivitis type, unspecified laterality  prednisoLONE acetate (PRED FORTE) 1 % Suspension    ofloxacin (OCUFLOX) 0.3 % Solution              Differential diagnosis, natural history, supportive care, and indications for immediate follow-up discussed.    Advised the patient to follow-up with the primary care physician for recheck, reevaluation, and consideration of further management.    Please note that this dictation was created using voice recognition software. I have made a reasonable attempt to correct obvious errors, but I expect that there are errors of grammar and possibly content that I did not discover before finalizing the note.

## 2021-04-12 NOTE — PATIENT INSTRUCTIONS
Uveitis  Uveitis is inflammation of the eye. It often affects the middle part of the eye (uvea). This area contains many of the blood vessels that supply the rest of the eye. The uvea is made up of three structures:  · The middle layer of the eyeball (choroid).  · The colored part of the front of the eye (iris).  · The connection between the iris and the choroid (ciliary body).  Uveitis can affect any part of the uvea as well as other important structures in the eye. There are many types of uveitis:  · Iritis, or anterior uveitis, affects the iris. This is the most common type. It can start suddenly and can last for many weeks.  · Intermediate uveitis affects the structures in the middle of the eye. This includes the fluid that fills the eye (vitreous). This type can last for years and can come and go.  · Posterior uveitis affects the structures in the back of the eye. This includes the light-sensitive layer of cells that is needed for vision (retina). This is the least common type.  · Panuveitis affects all layers of the eye.  Uveitis can affect one eye or both eyes. It can cause short-term or long-term symptoms. Symptoms may go away and come back. Over time, the condition can damage or destroy eye structures and may lead to vision loss.  What are the causes?  This condition may be caused by:  · Autoimmune diseases. These are diseases in which the body's defense system (immune system) mistakenly attacks the body's own tissues.  · Infections that start in the eye or spread to the eye.  · Inflammatory diseases that can affect the eye.  · Eye injuries.  In some cases, the cause may not be known.  What increases the risk?  The following factors may make you more likely to develop this condition:  · Being 20-60 years old.  · Using tobacco products, such as cigarettes.  · Taking certain medicines.  · Having certain inherited genes.  What are the signs or symptoms?  Symptoms of this condition depend on the type of  uveitis. Common symptoms include:  · Eye redness.  · Eye pain.  · Blurred vision.  · Decreased vision.  · Having a blind spot in your vision.  · Floating dark spots in your vision (floaters).  · Seeing flashing lights (photopsia).  · Sensitivity to light (photophobia).  · A white section on the lower part of the iris (hypopyon).  How is this diagnosed?  This condition may be diagnosed based on:  · An eye exam.  · A vision test using eye charts.  · An exam in which a scope is used to view inside the eye (ophthalmoscope or slit lamp). Eye drops may be used to widen (dilate) your pupil to make it easier to see inside your eye.  · A test to measure eye pressure.  You may have other medical tests to help determine the cause of your uveitis.  How is this treated?  Treatment for this condition may depend on the type of uveitis that you have. It should be started right away to help prevent vision loss. Treatment may include:  · Medicines to treat inflammation (steroids). You may get steroids:  ? As eye drops.  ? As an injection into your eye.  ? By mouth.  · Eye drops to dilate the pupil and reduce pressure inside the eye.  · In some cases, medicines may be given through an IV or through a device that is implanted inside the eye.  · Other medicines may be needed depending on the cause of your uveitis.  · In rare cases, surgery may be needed (vitrectomy).  Follow these instructions at home:    · Take over-the-counter and prescription medicines only as told by your health care provider.  · Follow instructions for safely putting eye drops in your eyes.  · Drink enough fluid to keep your urine pale yellow.  · Follow instructions from your health care provider about any restrictions on your activities. Ask what activities are safe for you.  · Do not use any products that contain nicotine or tobacco, such as cigarettes and e-cigarettes. If you need help quitting, ask your health care provider.  · Keep all follow-up visits as told  by your health care provider. This is important.  Contact a health care provider if:  · Your symptoms do not improve.  Get help right away if:  · You have vision loss in either eye.  · You have more redness in one eye or both eyes.  · Your eyes are very sensitive to light.  · You have pain or aching in either eye.  Summary  · Uveitis is inflammation of the eye. It often affects the middle part of the eye (uvea). This area contains many of the blood vessels that supply the rest of the eye.  · Uveitis can affect one eye or both eyes. It can cause short-term or long-term symptoms. Symptoms may go away and come back.  · Treatment for this condition may depend on the type of uveitis that you have. It should be started right away to help prevent vision loss.  · Over time, the condition can damage or destroy eye structures and may lead to vision loss.  This information is not intended to replace advice given to you by your health care provider. Make sure you discuss any questions you have with your health care provider.  Document Released: 03/16/2010 Document Revised: 01/08/2019 Document Reviewed: 01/08/2019  Elsevier Patient Education © 2020 [employer]

## 2021-04-19 ENCOUNTER — PATIENT MESSAGE (OUTPATIENT)
Dept: MEDICAL GROUP | Facility: PHYSICIAN GROUP | Age: 26
End: 2021-04-19

## 2021-04-19 DIAGNOSIS — H20.9 UVEITIS: ICD-10-CM

## 2021-04-20 NOTE — PROGRESS NOTES
Patient was diagnosed with uveitis from his ophthalmologist.  They suggested an autoimmune/rheumatological work-up.  Will order for patient and schedule follow-up discussed in detail.

## 2021-05-05 ENCOUNTER — HOSPITAL ENCOUNTER (OUTPATIENT)
Dept: LAB | Facility: MEDICAL CENTER | Age: 26
End: 2021-05-05
Attending: PHYSICIAN ASSISTANT
Payer: COMMERCIAL

## 2021-05-05 DIAGNOSIS — H20.9 UVEITIS: ICD-10-CM

## 2021-05-05 DIAGNOSIS — E55.9 VITAMIN D DEFICIENCY: ICD-10-CM

## 2021-05-05 DIAGNOSIS — E78.1 HYPERTRIGLYCERIDEMIA: ICD-10-CM

## 2021-05-05 DIAGNOSIS — Z00.00 GENERAL MEDICAL EXAM: ICD-10-CM

## 2021-05-05 LAB
ALBUMIN SERPL BCP-MCNC: 4.4 G/DL (ref 3.2–4.9)
ALBUMIN/GLOB SERPL: 1.4 G/DL
ALP SERPL-CCNC: 76 U/L (ref 30–99)
ALT SERPL-CCNC: 16 U/L (ref 2–50)
ANION GAP SERPL CALC-SCNC: 10 MMOL/L (ref 7–16)
AST SERPL-CCNC: 15 U/L (ref 12–45)
BASOPHILS # BLD AUTO: 0.8 % (ref 0–1.8)
BASOPHILS # BLD: 0.07 K/UL (ref 0–0.12)
BILIRUB CONJ SERPL-MCNC: <0.2 MG/DL (ref 0.1–0.5)
BILIRUB INDIRECT SERPL-MCNC: NORMAL MG/DL (ref 0–1)
BILIRUB SERPL-MCNC: 0.6 MG/DL (ref 0.1–1.5)
BUN SERPL-MCNC: 9 MG/DL (ref 8–22)
CALCIUM SERPL-MCNC: 9.2 MG/DL (ref 8.5–10.5)
CHLORIDE SERPL-SCNC: 101 MMOL/L (ref 96–112)
CHOLEST SERPL-MCNC: 151 MG/DL (ref 100–199)
CO2 SERPL-SCNC: 25 MMOL/L (ref 20–33)
CREAT SERPL-MCNC: 0.92 MG/DL (ref 0.5–1.4)
CRP SERPL HS-MCNC: <0.3 MG/DL (ref 0–0.75)
EOSINOPHIL # BLD AUTO: 0.19 K/UL (ref 0–0.51)
EOSINOPHIL NFR BLD: 2 % (ref 0–6.9)
ERYTHROCYTE [DISTWIDTH] IN BLOOD BY AUTOMATED COUNT: 39.2 FL (ref 35.9–50)
ERYTHROCYTE [SEDIMENTATION RATE] IN BLOOD BY WESTERGREN METHOD: 6 MM/HOUR (ref 0–20)
FASTING STATUS PATIENT QL REPORTED: NORMAL
GLOBULIN SER CALC-MCNC: 3.1 G/DL (ref 1.9–3.5)
GLUCOSE SERPL-MCNC: 86 MG/DL (ref 65–99)
HCT VFR BLD AUTO: 53.8 % (ref 42–52)
HDLC SERPL-MCNC: 36 MG/DL
HGB BLD-MCNC: 17.9 G/DL (ref 14–18)
IMM GRANULOCYTES # BLD AUTO: 0.14 K/UL (ref 0–0.11)
IMM GRANULOCYTES NFR BLD AUTO: 1.5 % (ref 0–0.9)
LDLC SERPL CALC-MCNC: 77 MG/DL
LYMPHOCYTES # BLD AUTO: 2.05 K/UL (ref 1–4.8)
LYMPHOCYTES NFR BLD: 22 % (ref 22–41)
MCH RBC QN AUTO: 28 PG (ref 27–33)
MCHC RBC AUTO-ENTMCNC: 33.3 G/DL (ref 33.7–35.3)
MCV RBC AUTO: 84.2 FL (ref 81.4–97.8)
MONOCYTES # BLD AUTO: 0.49 K/UL (ref 0–0.85)
MONOCYTES NFR BLD AUTO: 5.3 % (ref 0–13.4)
NEUTROPHILS # BLD AUTO: 6.38 K/UL (ref 1.82–7.42)
NEUTROPHILS NFR BLD: 68.4 % (ref 44–72)
NRBC # BLD AUTO: 0 K/UL
NRBC BLD-RTO: 0 /100 WBC
PLATELET # BLD AUTO: 219 K/UL (ref 164–446)
PMV BLD AUTO: 12 FL (ref 9–12.9)
POTASSIUM SERPL-SCNC: 4 MMOL/L (ref 3.6–5.5)
PROT SERPL-MCNC: 7.5 G/DL (ref 6–8.2)
RBC # BLD AUTO: 6.39 M/UL (ref 4.7–6.1)
RHEUMATOID FACT SER IA-ACNC: <10 IU/ML (ref 0–14)
SODIUM SERPL-SCNC: 136 MMOL/L (ref 135–145)
TRIGL SERPL-MCNC: 191 MG/DL (ref 0–149)
TSH SERPL DL<=0.005 MIU/L-ACNC: 0.83 UIU/ML (ref 0.38–5.33)
WBC # BLD AUTO: 9.3 K/UL (ref 4.8–10.8)

## 2021-05-05 PROCEDURE — 82248 BILIRUBIN DIRECT: CPT

## 2021-05-05 PROCEDURE — 85025 COMPLETE CBC W/AUTO DIFF WBC: CPT

## 2021-05-05 PROCEDURE — 86038 ANTINUCLEAR ANTIBODIES: CPT

## 2021-05-05 PROCEDURE — 84443 ASSAY THYROID STIM HORMONE: CPT

## 2021-05-05 PROCEDURE — 80061 LIPID PANEL: CPT

## 2021-05-05 PROCEDURE — 86812 HLA TYPING A B OR C: CPT

## 2021-05-05 PROCEDURE — 82306 VITAMIN D 25 HYDROXY: CPT

## 2021-05-05 PROCEDURE — 85652 RBC SED RATE AUTOMATED: CPT

## 2021-05-05 PROCEDURE — 86140 C-REACTIVE PROTEIN: CPT

## 2021-05-05 PROCEDURE — 86200 CCP ANTIBODY: CPT

## 2021-05-05 PROCEDURE — 36415 COLL VENOUS BLD VENIPUNCTURE: CPT

## 2021-05-05 PROCEDURE — 80053 COMPREHEN METABOLIC PANEL: CPT

## 2021-05-05 PROCEDURE — 86431 RHEUMATOID FACTOR QUANT: CPT

## 2021-05-07 LAB
25(OH)D3 SERPL-MCNC: 22 NG/ML (ref 30–80)
CCP IGG SERPL-ACNC: 2 UNITS (ref 0–19)
HLA-B27 QL FC: NEGATIVE
NUCLEAR IGG SER QL IA: NORMAL

## 2021-05-10 ENCOUNTER — TELEPHONE (OUTPATIENT)
Dept: MEDICAL GROUP | Facility: PHYSICIAN GROUP | Age: 26
End: 2021-05-10

## 2021-05-10 NOTE — TELEPHONE ENCOUNTER
----- Message from Yuridia Alvarado P.A.-C. sent at 5/10/2021 12:44 PM PDT -----  Please call patient about their labs.     There are a few things on their labs I'd like to talk about. Please schedule a follow up.     Thank you,    Yuridia Alvarado P.A.-C.

## 2021-05-18 PROBLEM — H20.9 UVEITIS: Status: ACTIVE | Noted: 2021-05-18

## 2021-05-18 NOTE — ASSESSMENT & PLAN NOTE
Patient saw his eye Dr. April 2021 diagnosed with uveitis.  Eye doctor recommended he proceed with a further work-up by his PCP.  Ordered an autoimmune and rheumatological work-up for the patient.  Labs May 5, 2021 essentially negative.    Saw eye doctor 2 weeks ago and uveitis was resolving, was using steroid eye drops.

## 2021-05-19 ENCOUNTER — OFFICE VISIT (OUTPATIENT)
Dept: MEDICAL GROUP | Facility: PHYSICIAN GROUP | Age: 26
End: 2021-05-19
Payer: COMMERCIAL

## 2021-05-19 VITALS
HEIGHT: 67 IN | TEMPERATURE: 97 F | WEIGHT: 231 LBS | OXYGEN SATURATION: 95 % | RESPIRATION RATE: 12 BRPM | HEART RATE: 87 BPM | DIASTOLIC BLOOD PRESSURE: 70 MMHG | BODY MASS INDEX: 36.26 KG/M2 | SYSTOLIC BLOOD PRESSURE: 116 MMHG

## 2021-05-19 DIAGNOSIS — R79.89 ABNORMAL CBC: ICD-10-CM

## 2021-05-19 DIAGNOSIS — E55.9 VITAMIN D DEFICIENCY: ICD-10-CM

## 2021-05-19 DIAGNOSIS — E78.1 HYPERTRIGLYCERIDEMIA: ICD-10-CM

## 2021-05-19 DIAGNOSIS — Z79.899 HIGH RISK MEDICATION USE: ICD-10-CM

## 2021-05-19 DIAGNOSIS — H20.9 UVEITIS: ICD-10-CM

## 2021-05-19 DIAGNOSIS — R06.83 SNORING: ICD-10-CM

## 2021-05-19 PROCEDURE — 99214 OFFICE O/P EST MOD 30 MIN: CPT | Performed by: PHYSICIAN ASSISTANT

## 2021-05-19 RX ORDER — FENOFIBRATE 48 MG/1
48 TABLET, COATED ORAL DAILY
Qty: 90 TABLET | Refills: 1 | Status: SHIPPED | OUTPATIENT
Start: 2021-05-19 | End: 2021-09-24

## 2021-05-19 ASSESSMENT — PATIENT HEALTH QUESTIONNAIRE - PHQ9: CLINICAL INTERPRETATION OF PHQ2 SCORE: 0

## 2021-05-19 ASSESSMENT — FIBROSIS 4 INDEX: FIB4 SCORE: 0.45

## 2021-05-19 NOTE — PROGRESS NOTES
CC:   Chief Complaint   Patient presents with   • Lab Results   • Hyperlipidemia          HISTORY OF PRESENT ILLNESS: Patient is a 25 y.o. male established patient who presents today to follow up on the following conditions:       Health Maintenance: Completed    Uveitis  Patient saw his eye DrSandra April 2021 diagnosed with uveitis.  Eye doctor recommended he proceed with a further work-up by his PCP.  Ordered an autoimmune and rheumatological work-up for the patient.  Labs May 5, 2021 essentially negative.    Saw eye doctor 2 weeks ago and uveitis was resolving, was using steroid eye drops.     Hypertriglyceridemia  This is a chronic condition.   Latest Labs:   Lab Results   Component Value Date/Time    CHOLSTRLTOT 151 05/05/2021 02:47 PM    LDL 77 05/05/2021 02:47 PM    HDL 36 (A) 05/05/2021 02:47 PM    TRIGLYCERIDE 191 (H) 05/05/2021 02:47 PM      Medications: none, tried fenofibrate last visit. He went off medication at the beginning of the month. No side effects.       Abnormal CBC      Smokes marijuana once every week.   Dehdyrated- no he did good with water.   He snores at night. No gasping for breath.   STOP BANG:   3 (5/19/2021  2:44 PM)  Has tonsils, no FH sleep apnea.     Vitamin D deficiency  Recommend restart Vitamin D 2000 IU daily.       Patient Active Problem List    Diagnosis Date Noted   • Uveitis 05/18/2021   • Abnormal CBC 12/16/2020   • Skin nodule 08/11/2020   • Vitamin D deficiency 08/11/2020   • Hypertriglyceridemia 10/18/2019   • BMI 35.0-35.9,adult 10/17/2019      Allergies:Patient has no known allergies.    Current Outpatient Medications   Medication Sig Dispense Refill   • fenofibrate (TRICOR) 48 MG Tab Take 1 tablet by mouth every day. 90 tablet 1     No current facility-administered medications for this visit.       Social History     Tobacco Use   • Smoking status: Never Smoker   • Smokeless tobacco: Never Used   Vaping Use   • Vaping Use: Never used   Substance Use Topics   • Alcohol  "use: Yes     Comment: 2 drinks per week.    • Drug use: Yes     Types: Marijuana     Comment: occasionally      Social History     Social History Narrative   • Not on file       History reviewed. No pertinent family history.    Review of Systems:    Constitutional: No Fevers, Chills  Eyes: No vision changes  ENT: No hearing changes  Resp: No Shortness of breath  CV: No Chest pain  GI: No Nausea/Vomiting  MSK: No weakness  Skin: No rashes  Neuro: No Headaches  Psych: No Suicidal ideations    All remaining systems reviewed and found to be negative, except as stated above.    Exam:    /70   Pulse 87   Temp 36.1 °C (97 °F) (Temporal)   Resp 12   Ht 1.702 m (5' 7\")   Wt 105 kg (231 lb)   SpO2 95%  Body mass index is 36.18 kg/m².    General:  Well nourished, well developed male in NAD  HENT: Atraumatic, normocephalic  EYES: Extraocular movements intact  NECK: Supple, FROM  CHEST: No deformities, Equal chest expansion  RESP: Unlabored, no stridor or audible wheeze  HEART: Regular Rate and rhythm.   Extremities: No Clubbing, Cyanosis, or Edema  Skin: Warm/dry, without rashes  Neuro: A/O x 4, due to COVID-19- did not have patient remove face mask to test cranial nerves.  Motor/sensory grossly intact  Psych: Normal behavior, normal affect      Lab review:  Labs are reviewed and discussed with a patient  Lab Results   Component Value Date/Time    CHOLSTRLTOT 151 05/05/2021 02:47 PM    LDL 77 05/05/2021 02:47 PM    HDL 36 (A) 05/05/2021 02:47 PM    TRIGLYCERIDE 191 (H) 05/05/2021 02:47 PM       Lab Results   Component Value Date/Time    SODIUM 136 05/05/2021 02:47 PM    POTASSIUM 4.0 05/05/2021 02:47 PM    CHLORIDE 101 05/05/2021 02:47 PM    CO2 25 05/05/2021 02:47 PM    GLUCOSE 86 05/05/2021 02:47 PM    BUN 9 05/05/2021 02:47 PM    CREATININE 0.92 05/05/2021 02:47 PM    BUNCREATRAT 14 08/18/2020 07:47 AM     Lab Results   Component Value Date/Time    ALKPHOSPHAT 76 05/05/2021 02:47 PM    ASTSGOT 15 05/05/2021 02:47 " PM    ALTSGPT 16 05/05/2021 02:47 PM    TBILIRUBIN 0.6 05/05/2021 02:47 PM      No results found for: HBA1C  Lab Results   Component Value Date/Time    TSH 1.510 08/18/2020 07:47 AM     No results found for: FREET4    Lab Results   Component Value Date/Time    WBC 9.3 05/05/2021 02:47 PM    RBC 6.39 (H) 05/05/2021 02:47 PM    HEMOGLOBIN 17.9 05/05/2021 02:47 PM    HEMATOCRIT 53.8 (H) 05/05/2021 02:47 PM    MCV 84.2 05/05/2021 02:47 PM    MCH 28.0 05/05/2021 02:47 PM    MCHC 33.3 (L) 05/05/2021 02:47 PM    MPV 12.0 05/05/2021 02:47 PM    NEUTSPOLYS 68.40 05/05/2021 02:47 PM    LYMPHOCYTES 22.00 05/05/2021 02:47 PM    MONOCYTES 5.30 05/05/2021 02:47 PM    EOSINOPHILS 2.00 05/05/2021 02:47 PM    BASOPHILS 0.80 05/05/2021 02:47 PM          Assessment/Plan:  1. Uveitis  Resolving. Follow up with eye doctor.    Reviewed consult note from patient's eye doctor.  They recommended obtaining a CBC, CRP, ESR, DAWNA, RF, RPR, angiotensin-converting enzyme, chest x-ray, HLA-B27, PPD and SAMEER test.  Unfortunately did not receive the consult until after I placed his orders.  All these tests were ordered except for the syphilis, angiotensin-converting enzyme, chest x-ray SAMEER and tuberculosis test.  Ordered for patient today.    2. Hypertriglyceridemia   - Lipid Profile; Future  Patient agreeable to restarting Tricor 48 mg. Continue to work on lifestyle modifications. Will taper off Tricor if we can.      3. Abnormal CBC  Red blood cell and hematocrit still elevated.  Patient smokes marijuana once every other week.  He does snore.  Intermediate score on stop bang.  Do recommend a sleep study.  He states he was adequately hydrated before his last set of labs.  Does not use tobacco products.  4. Vitamin D deficiency  Recommend restart supplementation 2000 IUs a day.  5. Snoring  - REFERRAL TO PULMONARY AND SLEEP MEDICINE    6. High risk medication use  - HEPATIC FUNCTION PANEL; Future    Other orders  - fenofibrate (TRICOR) 48 MG Tab;  Take 1 tablet by mouth every day.  Dispense: 90 tablet; Refill: 1       Follow-up: Return in about 3 months (around 8/19/2021).    Please note that this dictation was created using voice recognition software. I have made every reasonable attempt to correct obvious errors, but I expect that there are errors of grammar and possibly content that I did not discover before finalizing the note.

## 2021-05-19 NOTE — ASSESSMENT & PLAN NOTE
This is a chronic condition.   Latest Labs:   Lab Results   Component Value Date/Time    CHOLSTRLTOT 151 05/05/2021 02:47 PM    LDL 77 05/05/2021 02:47 PM    HDL 36 (A) 05/05/2021 02:47 PM    TRIGLYCERIDE 191 (H) 05/05/2021 02:47 PM      Medications: none, tried fenofibrate last visit. He went off medication at the beginning of the month. No side effects.

## 2021-05-19 NOTE — ASSESSMENT & PLAN NOTE
Smokes marijuana once every week.   Dehdyrated- no he did good with water.   He snores at night. No gasping for breath.   STOP BANG:   3 (5/19/2021  2:44 PM)  Has tonsils, no FH sleep apnea.

## 2021-09-24 ENCOUNTER — OFFICE VISIT (OUTPATIENT)
Dept: MEDICAL GROUP | Facility: PHYSICIAN GROUP | Age: 26
End: 2021-09-24

## 2021-09-24 VITALS
HEIGHT: 67 IN | TEMPERATURE: 96.9 F | SYSTOLIC BLOOD PRESSURE: 114 MMHG | RESPIRATION RATE: 20 BRPM | HEART RATE: 80 BPM | WEIGHT: 244.25 LBS | DIASTOLIC BLOOD PRESSURE: 72 MMHG | BODY MASS INDEX: 38.34 KG/M2 | OXYGEN SATURATION: 98 %

## 2021-09-24 DIAGNOSIS — Z00.00 GENERAL MEDICAL EXAM: ICD-10-CM

## 2021-09-24 DIAGNOSIS — Z23 NEED FOR VACCINATION: ICD-10-CM

## 2021-09-24 DIAGNOSIS — Z11.1 SCREENING-PULMONARY TB: ICD-10-CM

## 2021-09-24 PROCEDURE — 90471 IMMUNIZATION ADMIN: CPT | Performed by: PHYSICIAN ASSISTANT

## 2021-09-24 PROCEDURE — 90686 IIV4 VACC NO PRSV 0.5 ML IM: CPT | Performed by: PHYSICIAN ASSISTANT

## 2021-09-24 PROCEDURE — 99395 PREV VISIT EST AGE 18-39: CPT | Performed by: PHYSICIAN ASSISTANT

## 2021-09-24 ASSESSMENT — FIBROSIS 4 INDEX: FIB4 SCORE: 0.45

## 2021-09-24 NOTE — PROGRESS NOTES
"CC:   Chief Complaint   Patient presents with   • Immunizations   • Medical Clearance     EMT school          HISTORY OF PRESENT ILLNESS: Patient is a 25 y.o. 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 Needs flu shot and PPD placed.       Patient Active Problem List    Diagnosis Date Noted   • General medical exam 09/24/2021   • Uveitis 05/18/2021   • Abnormal CBC 12/16/2020   • Skin nodule 08/11/2020   • Vitamin D deficiency 08/11/2020   • Hypertriglyceridemia 10/18/2019   • BMI 35.0-35.9,adult 10/17/2019      Allergies:Patient has no known allergies.    No current outpatient medications on file.     No current facility-administered medications for this visit.       Social History     Tobacco Use   • Smoking status: Never Smoker   • Smokeless tobacco: Never Used   Vaping Use   • Vaping Use: Never used   Substance Use Topics   • Alcohol use: Yes     Comment: Socially    • Drug use: Yes     Types: Marijuana     Comment: occasionally      Social History     Social History Narrative   • Not on file       History reviewed. No pertinent family history.    Review of Systems:    Constitutional: No Fevers, Chills  Eyes: No vision changes  ENT: No hearing changes  Resp: No Shortness of breath  CV: No Chest pain  GI: No Nausea/Vomiting  MSK: No weakness  Skin: No rashes  Neuro: No Headaches  Psych: No Suicidal ideations    All remaining systems reviewed and found to be negative, except as stated above.    Exam:    /72 (BP Location: Left arm, Patient Position: Sitting, BP Cuff Size: Adult)   Pulse 80   Temp 36.1 °C (96.9 °F) (Temporal)   Resp 20   Ht 1.702 m (5' 7\")   Wt 111 kg (244 lb 4 oz)   SpO2 98%  Body mass index is 38.25 kg/m².    General:  Well nourished, well developed male in NAD  HENT: Atraumatic, normocephalic  EYES: Extraocular " movements intact  NECK: Supple, FROM  CHEST: No deformities, Equal chest expansion  RESP: Unlabored, no stridor or audible wheeze  HEART: Regular Rate and rhythm.   ABD: Soft, Nontender, Non-Distended  Extremities: No Clubbing, Cyanosis, or Edema  Skin: Warm/dry, without rashes  Neuro: A/O x 4, due to COVID-19- did not have patient remove face mask to test cranial nerves.  Motor/sensory grossly intact  Psych: Normal behavior, normal affect      Lab review:  Labs are reviewed and discussed with a patient  Lab Results   Component Value Date/Time    CHOLSTRLTOT 151 05/05/2021 02:47 PM    LDL 77 05/05/2021 02:47 PM    HDL 36 (A) 05/05/2021 02:47 PM    TRIGLYCERIDE 191 (H) 05/05/2021 02:47 PM       Lab Results   Component Value Date/Time    SODIUM 136 05/05/2021 02:47 PM    POTASSIUM 4.0 05/05/2021 02:47 PM    CHLORIDE 101 05/05/2021 02:47 PM    CO2 25 05/05/2021 02:47 PM    GLUCOSE 86 05/05/2021 02:47 PM    BUN 9 05/05/2021 02:47 PM    CREATININE 0.92 05/05/2021 02:47 PM    BUNCREATRAT 14 08/18/2020 07:47 AM     Lab Results   Component Value Date/Time    ALKPHOSPHAT 76 05/05/2021 02:47 PM    ASTSGOT 15 05/05/2021 02:47 PM    ALTSGPT 16 05/05/2021 02:47 PM    TBILIRUBIN 0.6 05/05/2021 02:47 PM      No results found for: HBA1C  Lab Results   Component Value Date/Time    TSH 1.510 08/18/2020 07:47 AM     No results found for: FREET4    Lab Results   Component Value Date/Time    WBC 9.3 05/05/2021 02:47 PM    RBC 6.39 (H) 05/05/2021 02:47 PM    HEMOGLOBIN 17.9 05/05/2021 02:47 PM    HEMATOCRIT 53.8 (H) 05/05/2021 02:47 PM    MCV 84.2 05/05/2021 02:47 PM    MCH 28.0 05/05/2021 02:47 PM    MCHC 33.3 (L) 05/05/2021 02:47 PM    MPV 12.0 05/05/2021 02:47 PM    NEUTSPOLYS 68.40 05/05/2021 02:47 PM    LYMPHOCYTES 22.00 05/05/2021 02:47 PM    MONOCYTES 5.30 05/05/2021 02:47 PM    EOSINOPHILS 2.00 05/05/2021 02:47 PM    BASOPHILS 0.80 05/05/2021 02:47 PM          Assessment/Plan:  1. General medical exam  Patient cleared for EMT  course without limitations.   2. Need for vaccination  - INFLUENZA VACCINE QUAD INJ (PF)    3. Screening-pulmonary TB  - Quantiferon Gold TB (PPD); Future       Follow-up: Return as needed.    Please note that this dictation was created using voice recognition software. I have made every reasonable attempt to correct obvious errors, but I expect that there are errors of grammar and possibly content that I did not discover before finalizing the note.

## 2021-09-24 NOTE — ASSESSMENT & PLAN NOTE
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 Needs flu shot and PPD placed.

## 2021-09-24 NOTE — LETTER
September 24, 2021        Atul Burroughs, is a patient of mine since 08/11/2020. Patient is cleared for EMT course without restrictions.                        Yuridia Alvarado P.A.-C.

## 2022-07-01 ENCOUNTER — OFFICE VISIT (OUTPATIENT)
Dept: URGENT CARE | Facility: PHYSICIAN GROUP | Age: 27
End: 2022-07-01
Payer: COMMERCIAL

## 2022-07-01 VITALS
WEIGHT: 248.4 LBS | HEART RATE: 66 BPM | OXYGEN SATURATION: 100 % | DIASTOLIC BLOOD PRESSURE: 88 MMHG | HEIGHT: 67 IN | RESPIRATION RATE: 18 BRPM | SYSTOLIC BLOOD PRESSURE: 130 MMHG | BODY MASS INDEX: 38.99 KG/M2 | TEMPERATURE: 97.1 F

## 2022-07-01 DIAGNOSIS — K64.0 GRADE I HEMORRHOIDS: ICD-10-CM

## 2022-07-01 PROCEDURE — 99213 OFFICE O/P EST LOW 20 MIN: CPT | Performed by: NURSE PRACTITIONER

## 2022-07-01 RX ORDER — HYDROCORTISONE 25 MG/G
1 CREAM TOPICAL DAILY
Qty: 28 G | Refills: 0 | Status: SHIPPED | OUTPATIENT
Start: 2022-07-01 | End: 2022-07-11

## 2022-07-01 ASSESSMENT — ENCOUNTER SYMPTOMS
DIARRHEA: 0
BLOOD IN STOOL: 0
ABDOMINAL PAIN: 0
CONSTIPATION: 0

## 2022-07-01 ASSESSMENT — FIBROSIS 4 INDEX: FIB4 SCORE: 0.46

## 2022-07-01 NOTE — PROGRESS NOTES
"Subjective:     Atul Burroughs is a 26 y.o. male who presents for Hemorrhoids (Noticed it on Wednesday while showering.  Feels painful)      Hemorrhoids  Pertinent negatives include no abdominal pain.     Pt presents for evaluation of a new problem. Atul is a very pleasant 26-year-old male who presents to urgent care today with complaints of a hemorrhoid for the past 2 days.  He notes that his pain is worse with sitting and standing. Pain is a 5/10. No pain with BM, negative for bleeding.  He has been using Preparation H for his symptoms.  He denies any constipation or diarrhea.    Review of Systems   Gastrointestinal: Positive for hemorrhoids. Negative for abdominal pain, blood in stool, constipation and diarrhea.       PMH: History reviewed. No pertinent past medical history.  ALLERGIES: No Known Allergies  SURGHX: History reviewed. No pertinent surgical history.  SOCHX:   Social History     Socioeconomic History   • Marital status: Single   Tobacco Use   • Smoking status: Never Smoker   • Smokeless tobacco: Never Used   Vaping Use   • Vaping Use: Never used   Substance and Sexual Activity   • Alcohol use: Yes     Comment: Socially    • Drug use: Yes     Types: Marijuana     Comment: occasionally    • Sexual activity: Yes     Partners: Female     FH: History reviewed. No pertinent family history.      Objective:   /88 (BP Location: Left arm, Patient Position: Sitting, BP Cuff Size: Adult)   Pulse 66   Temp 36.2 °C (97.1 °F) (Temporal)   Resp 18   Ht 1.702 m (5' 7\")   Wt 113 kg (248 lb 6.4 oz)   SpO2 100%   BMI 38.90 kg/m²     Physical Exam  Vitals and nursing note reviewed.   Constitutional:       General: He is not in acute distress.     Appearance: Normal appearance. He is normal weight. He is not ill-appearing or toxic-appearing.   HENT:      Head: Normocephalic.      Right Ear: External ear normal.      Left Ear: External ear normal.      Nose: Nose normal.      Mouth/Throat:      " Mouth: Mucous membranes are moist.   Eyes:      General:         Right eye: No discharge.         Left eye: No discharge.      Extraocular Movements: Extraocular movements intact.      Conjunctiva/sclera: Conjunctivae normal.      Pupils: Pupils are equal, round, and reactive to light.   Pulmonary:      Effort: Pulmonary effort is normal.      Breath sounds: Normal breath sounds.   Abdominal:      General: Abdomen is flat.   Genitourinary:     Rectum: External hemorrhoid present.      Comments: Positive for visible external hemorrhoid at 9:00.  Negative for bleeding or erythema.  Musculoskeletal:         General: Normal range of motion.      Cervical back: Normal range of motion and neck supple. No rigidity.   Lymphadenopathy:      Cervical: No cervical adenopathy.   Skin:     General: Skin is warm and dry.   Neurological:      General: No focal deficit present.      Mental Status: He is alert and oriented to person, place, and time. Mental status is at baseline.   Psychiatric:         Mood and Affect: Mood normal.         Behavior: Behavior normal.         Judgment: Judgment normal.         Assessment/Plan:   Assessment    1. Grade I hemorrhoids  hydrocortisone rectal (PERIANAL) 2.5% Cream     Hemorrhoid is likely grade 1 at this time.  He was prescribed hydrocortisone cream topically for relief of symptoms.  Supportive treatment discussed.  Follow-up for worsening or persistent symptoms.  AVS handout given and reviewed with patient. Pt educated on red flags and when to seek treatment back in ER or UC.

## 2022-07-01 NOTE — LETTER
July 1, 2022    To Whom It May Concern:         This is confirmation that Atul Burroughs attended his scheduled appointment with JOSI Zavala on 7/01/22.         If you have any questions please do not hesitate to call me at the phone number listed below.    Sincerely,          FRANSICO Zavala.  [phone]

## 2022-09-23 ENCOUNTER — OFFICE VISIT (OUTPATIENT)
Dept: URGENT CARE | Facility: PHYSICIAN GROUP | Age: 27
End: 2022-09-23
Payer: COMMERCIAL

## 2022-09-23 ENCOUNTER — HOSPITAL ENCOUNTER (OUTPATIENT)
Dept: RADIOLOGY | Facility: MEDICAL CENTER | Age: 27
End: 2022-09-23
Attending: STUDENT IN AN ORGANIZED HEALTH CARE EDUCATION/TRAINING PROGRAM
Payer: COMMERCIAL

## 2022-09-23 VITALS
HEART RATE: 78 BPM | DIASTOLIC BLOOD PRESSURE: 86 MMHG | HEIGHT: 67 IN | RESPIRATION RATE: 16 BRPM | BODY MASS INDEX: 38.92 KG/M2 | WEIGHT: 248 LBS | TEMPERATURE: 97.2 F | OXYGEN SATURATION: 97 % | SYSTOLIC BLOOD PRESSURE: 136 MMHG

## 2022-09-23 DIAGNOSIS — M79.675 GREAT TOE PAIN, LEFT: ICD-10-CM

## 2022-09-23 PROCEDURE — 99214 OFFICE O/P EST MOD 30 MIN: CPT | Performed by: STUDENT IN AN ORGANIZED HEALTH CARE EDUCATION/TRAINING PROGRAM

## 2022-09-23 PROCEDURE — 73630 X-RAY EXAM OF FOOT: CPT | Mod: LT

## 2022-09-23 RX ORDER — PREDNISONE 20 MG/1
40 TABLET ORAL DAILY
Qty: 10 TABLET | Refills: 0 | Status: SHIPPED | OUTPATIENT
Start: 2022-09-23 | End: 2022-09-28

## 2022-09-23 ASSESSMENT — FIBROSIS 4 INDEX: FIB4 SCORE: 0.46
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
## 2023-06-21 ENCOUNTER — APPOINTMENT (RX ONLY)
Dept: URBAN - METROPOLITAN AREA CLINIC 20 | Facility: CLINIC | Age: 28
Setting detail: DERMATOLOGY
End: 2023-06-21

## 2023-06-21 DIAGNOSIS — Z71.89 OTHER SPECIFIED COUNSELING: ICD-10-CM

## 2023-06-21 DIAGNOSIS — L21.8 OTHER SEBORRHEIC DERMATITIS: ICD-10-CM | Status: INADEQUATELY CONTROLLED

## 2023-06-21 DIAGNOSIS — D22 MELANOCYTIC NEVI: ICD-10-CM

## 2023-06-21 DIAGNOSIS — Q828 OTHER SPECIFIED ANOMALIES OF SKIN: ICD-10-CM

## 2023-06-21 DIAGNOSIS — Q826 OTHER SPECIFIED ANOMALIES OF SKIN: ICD-10-CM

## 2023-06-21 DIAGNOSIS — Q819 OTHER SPECIFIED ANOMALIES OF SKIN: ICD-10-CM

## 2023-06-21 PROBLEM — L85.8 OTHER SPECIFIED EPIDERMAL THICKENING: Status: ACTIVE | Noted: 2023-06-21

## 2023-06-21 PROBLEM — D22.61 MELANOCYTIC NEVI OF RIGHT UPPER LIMB, INCLUDING SHOULDER: Status: ACTIVE | Noted: 2023-06-21

## 2023-06-21 PROBLEM — D22.5 MELANOCYTIC NEVI OF TRUNK: Status: ACTIVE | Noted: 2023-06-21

## 2023-06-21 PROCEDURE — ? PRESCRIPTION

## 2023-06-21 PROCEDURE — ? SUNSCREEN RECOMMENDATIONS

## 2023-06-21 PROCEDURE — ? ADDITIONAL NOTES

## 2023-06-21 PROCEDURE — 99204 OFFICE O/P NEW MOD 45 MIN: CPT

## 2023-06-21 PROCEDURE — ? COUNSELING

## 2023-06-21 RX ORDER — EMOLLIENT COMBINATION NO.43
CREAM (GRAM) TOPICAL
Qty: 30 | Refills: 6 | Status: ERX | COMMUNITY
Start: 2023-06-21

## 2023-06-21 RX ORDER — KETOCONAZOLE 20 MG/G
CREAM TOPICAL QD
Qty: 60 | Refills: 3 | Status: ERX | COMMUNITY
Start: 2023-06-21

## 2023-06-21 RX ADMIN — Medication: at 00:00

## 2023-06-21 RX ADMIN — KETOCONAZOLE: 20 CREAM TOPICAL at 00:00

## 2023-06-21 ASSESSMENT — LOCATION SIMPLE DESCRIPTION DERM
LOCATION SIMPLE: LEFT SHOULDER
LOCATION SIMPLE: RIGHT FOREARM
LOCATION SIMPLE: ABDOMEN
LOCATION SIMPLE: RIGHT UPPER BACK
LOCATION SIMPLE: LEFT CHEEK
LOCATION SIMPLE: LEFT UPPER BACK
LOCATION SIMPLE: RIGHT SHOULDER
LOCATION SIMPLE: RIGHT LIP

## 2023-06-21 ASSESSMENT — LOCATION ZONE DERM
LOCATION ZONE: TRUNK
LOCATION ZONE: FACE
LOCATION ZONE: ARM
LOCATION ZONE: LIP

## 2023-06-21 ASSESSMENT — LOCATION DETAILED DESCRIPTION DERM
LOCATION DETAILED: RIGHT POSTERIOR SHOULDER
LOCATION DETAILED: LEFT LATERAL UPPER BACK
LOCATION DETAILED: LEFT POSTERIOR SHOULDER
LOCATION DETAILED: RIGHT VENTRAL PROXIMAL FOREARM
LOCATION DETAILED: RIGHT MID-UPPER BACK
LOCATION DETAILED: LEFT SUPERIOR LATERAL UPPER BACK
LOCATION DETAILED: RIGHT UPPER CUTANEOUS LIP
LOCATION DETAILED: RIGHT LATERAL UPPER BACK
LOCATION DETAILED: LEFT LATERAL ABDOMEN
LOCATION DETAILED: RIGHT SUPERIOR MEDIAL UPPER BACK
LOCATION DETAILED: LEFT MEDIAL MALAR CHEEK

## 2023-06-21 NOTE — PROCEDURE: ADDITIONAL NOTES
Detail Level: Simple
Additional Notes: Gave sample of the Cerave SA Cream
Render Risk Assessment In Note?: no

## 2024-02-20 ENCOUNTER — OFFICE VISIT (OUTPATIENT)
Dept: MEDICAL GROUP | Facility: PHYSICIAN GROUP | Age: 29
End: 2024-02-20
Payer: COMMERCIAL

## 2024-02-20 ENCOUNTER — HOSPITAL ENCOUNTER (OUTPATIENT)
Dept: LAB | Facility: MEDICAL CENTER | Age: 29
End: 2024-02-20
Attending: STUDENT IN AN ORGANIZED HEALTH CARE EDUCATION/TRAINING PROGRAM
Payer: COMMERCIAL

## 2024-02-20 VITALS
TEMPERATURE: 96.5 F | SYSTOLIC BLOOD PRESSURE: 140 MMHG | OXYGEN SATURATION: 97 % | WEIGHT: 264 LBS | HEIGHT: 67 IN | DIASTOLIC BLOOD PRESSURE: 80 MMHG | BODY MASS INDEX: 41.44 KG/M2 | HEART RATE: 80 BPM

## 2024-02-20 DIAGNOSIS — E78.1 HYPERTRIGLYCERIDEMIA: ICD-10-CM

## 2024-02-20 DIAGNOSIS — Z11.59 NEED FOR HEPATITIS C SCREENING TEST: ICD-10-CM

## 2024-02-20 DIAGNOSIS — Z00.00 WELLNESS EXAMINATION: ICD-10-CM

## 2024-02-20 DIAGNOSIS — E66.01 CLASS 3 SEVERE OBESITY DUE TO EXCESS CALORIES WITHOUT SERIOUS COMORBIDITY WITH BODY MASS INDEX (BMI) OF 40.0 TO 44.9 IN ADULT (HCC): ICD-10-CM

## 2024-02-20 DIAGNOSIS — H20.9 UVEITIS: ICD-10-CM

## 2024-02-20 DIAGNOSIS — Z83.3 FAMILY HISTORY OF DIABETES MELLITUS IN FATHER: ICD-10-CM

## 2024-02-20 DIAGNOSIS — Z76.89 ENCOUNTER TO ESTABLISH CARE: ICD-10-CM

## 2024-02-20 DIAGNOSIS — Z11.3 SCREENING EXAMINATION FOR SEXUALLY TRANSMITTED DISEASE: ICD-10-CM

## 2024-02-20 PROBLEM — E66.813 CLASS 3 SEVERE OBESITY DUE TO EXCESS CALORIES WITHOUT SERIOUS COMORBIDITY WITH BODY MASS INDEX (BMI) OF 40.0 TO 44.9 IN ADULT: Status: ACTIVE | Noted: 2019-10-17

## 2024-02-20 PROBLEM — R22.9 SKIN NODULE: Status: RESOLVED | Noted: 2020-08-11 | Resolved: 2024-02-20

## 2024-02-20 LAB
ALBUMIN SERPL BCP-MCNC: 4 G/DL (ref 3.2–4.9)
ALBUMIN/GLOB SERPL: 1.1 G/DL
ALP SERPL-CCNC: 82 U/L (ref 30–99)
ALT SERPL-CCNC: 36 U/L (ref 2–50)
ANION GAP SERPL CALC-SCNC: 13 MMOL/L (ref 7–16)
AST SERPL-CCNC: 31 U/L (ref 12–45)
BASOPHILS # BLD AUTO: 1.3 % (ref 0–1.8)
BASOPHILS # BLD: 0.11 K/UL (ref 0–0.12)
BILIRUB SERPL-MCNC: 0.3 MG/DL (ref 0.1–1.5)
BUN SERPL-MCNC: 14 MG/DL (ref 8–22)
CALCIUM ALBUM COR SERPL-MCNC: 9.2 MG/DL (ref 8.5–10.5)
CALCIUM SERPL-MCNC: 9.2 MG/DL (ref 8.5–10.5)
CHLORIDE SERPL-SCNC: 102 MMOL/L (ref 96–112)
CHOLEST SERPL-MCNC: 139 MG/DL (ref 100–199)
CO2 SERPL-SCNC: 24 MMOL/L (ref 20–33)
CREAT SERPL-MCNC: 1.01 MG/DL (ref 0.5–1.4)
EOSINOPHIL # BLD AUTO: 0.28 K/UL (ref 0–0.51)
EOSINOPHIL NFR BLD: 3.2 % (ref 0–6.9)
ERYTHROCYTE [DISTWIDTH] IN BLOOD BY AUTOMATED COUNT: 42.2 FL (ref 35.9–50)
FASTING STATUS PATIENT QL REPORTED: NORMAL
GFR SERPLBLD CREATININE-BSD FMLA CKD-EPI: 103 ML/MIN/1.73 M 2
GLOBULIN SER CALC-MCNC: 3.6 G/DL (ref 1.9–3.5)
GLUCOSE SERPL-MCNC: 108 MG/DL (ref 65–99)
HCT VFR BLD AUTO: 51.1 % (ref 42–52)
HCV AB SER QL: NORMAL
HDLC SERPL-MCNC: 35 MG/DL
HGB BLD-MCNC: 16.6 G/DL (ref 14–18)
HIV 1+2 AB+HIV1 P24 AG SERPL QL IA: NORMAL
IMM GRANULOCYTES # BLD AUTO: 0.12 K/UL (ref 0–0.11)
IMM GRANULOCYTES NFR BLD AUTO: 1.4 % (ref 0–0.9)
LDLC SERPL CALC-MCNC: 66 MG/DL
LYMPHOCYTES # BLD AUTO: 1.78 K/UL (ref 1–4.8)
LYMPHOCYTES NFR BLD: 20.6 % (ref 22–41)
MCH RBC QN AUTO: 27.3 PG (ref 27–33)
MCHC RBC AUTO-ENTMCNC: 32.5 G/DL (ref 32.3–36.5)
MCV RBC AUTO: 84 FL (ref 81.4–97.8)
MONOCYTES # BLD AUTO: 0.51 K/UL (ref 0–0.85)
MONOCYTES NFR BLD AUTO: 5.9 % (ref 0–13.4)
NEUTROPHILS # BLD AUTO: 5.83 K/UL (ref 1.82–7.42)
NEUTROPHILS NFR BLD: 67.6 % (ref 44–72)
NRBC # BLD AUTO: 0 K/UL
NRBC BLD-RTO: 0 /100 WBC (ref 0–0.2)
PLATELET # BLD AUTO: 222 K/UL (ref 164–446)
PMV BLD AUTO: 11.7 FL (ref 9–12.9)
POTASSIUM SERPL-SCNC: 4.1 MMOL/L (ref 3.6–5.5)
PROT SERPL-MCNC: 7.6 G/DL (ref 6–8.2)
RBC # BLD AUTO: 6.08 M/UL (ref 4.7–6.1)
SODIUM SERPL-SCNC: 139 MMOL/L (ref 135–145)
T PALLIDUM AB SER QL IA: NORMAL
TRIGL SERPL-MCNC: 188 MG/DL (ref 0–149)
WBC # BLD AUTO: 8.6 K/UL (ref 4.8–10.8)

## 2024-02-20 PROCEDURE — 86780 TREPONEMA PALLIDUM: CPT

## 2024-02-20 PROCEDURE — 87491 CHLMYD TRACH DNA AMP PROBE: CPT

## 2024-02-20 PROCEDURE — 87389 HIV-1 AG W/HIV-1&-2 AB AG IA: CPT

## 2024-02-20 PROCEDURE — 80053 COMPREHEN METABOLIC PANEL: CPT

## 2024-02-20 PROCEDURE — 85025 COMPLETE CBC W/AUTO DIFF WBC: CPT

## 2024-02-20 PROCEDURE — 87591 N.GONORRHOEAE DNA AMP PROB: CPT

## 2024-02-20 PROCEDURE — 3077F SYST BP >= 140 MM HG: CPT | Performed by: STUDENT IN AN ORGANIZED HEALTH CARE EDUCATION/TRAINING PROGRAM

## 2024-02-20 PROCEDURE — 86803 HEPATITIS C AB TEST: CPT

## 2024-02-20 PROCEDURE — 3079F DIAST BP 80-89 MM HG: CPT | Performed by: STUDENT IN AN ORGANIZED HEALTH CARE EDUCATION/TRAINING PROGRAM

## 2024-02-20 PROCEDURE — 36415 COLL VENOUS BLD VENIPUNCTURE: CPT

## 2024-02-20 PROCEDURE — 99214 OFFICE O/P EST MOD 30 MIN: CPT | Performed by: STUDENT IN AN ORGANIZED HEALTH CARE EDUCATION/TRAINING PROGRAM

## 2024-02-20 PROCEDURE — 80061 LIPID PANEL: CPT

## 2024-02-20 SDOH — ECONOMIC STABILITY: INCOME INSECURITY: HOW HARD IS IT FOR YOU TO PAY FOR THE VERY BASICS LIKE FOOD, HOUSING, MEDICAL CARE, AND HEATING?: SOMEWHAT HARD

## 2024-02-20 SDOH — ECONOMIC STABILITY: HOUSING INSECURITY

## 2024-02-20 SDOH — ECONOMIC STABILITY: INCOME INSECURITY: IN THE LAST 12 MONTHS, WAS THERE A TIME WHEN YOU WERE NOT ABLE TO PAY THE MORTGAGE OR RENT ON TIME?: NO

## 2024-02-20 SDOH — ECONOMIC STABILITY: HOUSING INSECURITY
IN THE LAST 12 MONTHS, WAS THERE A TIME WHEN YOU DID NOT HAVE A STEADY PLACE TO SLEEP OR SLEPT IN A SHELTER (INCLUDING NOW)?: NO

## 2024-02-20 SDOH — HEALTH STABILITY: PHYSICAL HEALTH: ON AVERAGE, HOW MANY MINUTES DO YOU ENGAGE IN EXERCISE AT THIS LEVEL?: 60 MIN

## 2024-02-20 SDOH — HEALTH STABILITY: PHYSICAL HEALTH: ON AVERAGE, HOW MANY DAYS PER WEEK DO YOU ENGAGE IN MODERATE TO STRENUOUS EXERCISE (LIKE A BRISK WALK)?: 2 DAYS

## 2024-02-20 SDOH — ECONOMIC STABILITY: TRANSPORTATION INSECURITY
IN THE PAST 12 MONTHS, HAS LACK OF TRANSPORTATION KEPT YOU FROM MEETINGS, WORK, OR FROM GETTING THINGS NEEDED FOR DAILY LIVING?: NO

## 2024-02-20 SDOH — ECONOMIC STABILITY: TRANSPORTATION INSECURITY
IN THE PAST 12 MONTHS, HAS LACK OF RELIABLE TRANSPORTATION KEPT YOU FROM MEDICAL APPOINTMENTS, MEETINGS, WORK OR FROM GETTING THINGS NEEDED FOR DAILY LIVING?: NO

## 2024-02-20 SDOH — ECONOMIC STABILITY: FOOD INSECURITY: WITHIN THE PAST 12 MONTHS, YOU WORRIED THAT YOUR FOOD WOULD RUN OUT BEFORE YOU GOT MONEY TO BUY MORE.: NEVER TRUE

## 2024-02-20 SDOH — ECONOMIC STABILITY: TRANSPORTATION INSECURITY
IN THE PAST 12 MONTHS, HAS THE LACK OF TRANSPORTATION KEPT YOU FROM MEDICAL APPOINTMENTS OR FROM GETTING MEDICATIONS?: NO

## 2024-02-20 SDOH — ECONOMIC STABILITY: FOOD INSECURITY: WITHIN THE PAST 12 MONTHS, THE FOOD YOU BOUGHT JUST DIDN'T LAST AND YOU DIDN'T HAVE MONEY TO GET MORE.: NEVER TRUE

## 2024-02-20 SDOH — HEALTH STABILITY: MENTAL HEALTH
STRESS IS WHEN SOMEONE FEELS TENSE, NERVOUS, ANXIOUS, OR CAN'T SLEEP AT NIGHT BECAUSE THEIR MIND IS TROUBLED. HOW STRESSED ARE YOU?: ONLY A LITTLE

## 2024-02-20 ASSESSMENT — SOCIAL DETERMINANTS OF HEALTH (SDOH)
DO YOU BELONG TO ANY CLUBS OR ORGANIZATIONS SUCH AS CHURCH GROUPS UNIONS, FRATERNAL OR ATHLETIC GROUPS, OR SCHOOL GROUPS?: NO
HOW OFTEN DO YOU ATTENT MEETINGS OF THE CLUB OR ORGANIZATION YOU BELONG TO?: NEVER
WITHIN THE PAST 12 MONTHS, YOU WORRIED THAT YOUR FOOD WOULD RUN OUT BEFORE YOU GOT THE MONEY TO BUY MORE: NEVER TRUE
HOW MANY DRINKS CONTAINING ALCOHOL DO YOU HAVE ON A TYPICAL DAY WHEN YOU ARE DRINKING: 3 OR 4
HOW OFTEN DO YOU GET TOGETHER WITH FRIENDS OR RELATIVES?: TWICE A WEEK
IN A TYPICAL WEEK, HOW MANY TIMES DO YOU TALK ON THE PHONE WITH FAMILY, FRIENDS, OR NEIGHBORS?: MORE THAN THREE TIMES A WEEK
HOW HARD IS IT FOR YOU TO PAY FOR THE VERY BASICS LIKE FOOD, HOUSING, MEDICAL CARE, AND HEATING?: SOMEWHAT HARD
HOW OFTEN DO YOU HAVE SIX OR MORE DRINKS ON ONE OCCASION: LESS THAN MONTHLY
DO YOU BELONG TO ANY CLUBS OR ORGANIZATIONS SUCH AS CHURCH GROUPS UNIONS, FRATERNAL OR ATHLETIC GROUPS, OR SCHOOL GROUPS?: NO
ARE YOU MARRIED, WIDOWED, DIVORCED, SEPARATED, NEVER MARRIED, OR LIVING WITH A PARTNER?: LIVING WITH PARTNER
HOW OFTEN DO YOU ATTEND CHURCH OR RELIGIOUS SERVICES?: NEVER
IN A TYPICAL WEEK, HOW MANY TIMES DO YOU TALK ON THE PHONE WITH FAMILY, FRIENDS, OR NEIGHBORS?: MORE THAN THREE TIMES A WEEK
ARE YOU MARRIED, WIDOWED, DIVORCED, SEPARATED, NEVER MARRIED, OR LIVING WITH A PARTNER?: LIVING WITH PARTNER
HOW OFTEN DO YOU ATTEND CHURCH OR RELIGIOUS SERVICES?: NEVER
HOW OFTEN DO YOU HAVE A DRINK CONTAINING ALCOHOL: 2-4 TIMES A MONTH
HOW OFTEN DO YOU ATTENT MEETINGS OF THE CLUB OR ORGANIZATION YOU BELONG TO?: NEVER
HOW OFTEN DO YOU GET TOGETHER WITH FRIENDS OR RELATIVES?: TWICE A WEEK

## 2024-02-20 ASSESSMENT — ENCOUNTER SYMPTOMS
SHORTNESS OF BREATH: 0
CHILLS: 0
PALPITATIONS: 0
FEVER: 0

## 2024-02-20 ASSESSMENT — LIFESTYLE VARIABLES
HOW OFTEN DO YOU HAVE SIX OR MORE DRINKS ON ONE OCCASION: LESS THAN MONTHLY
AUDIT-C TOTAL SCORE: 4
HOW MANY STANDARD DRINKS CONTAINING ALCOHOL DO YOU HAVE ON A TYPICAL DAY: 3 OR 4
SKIP TO QUESTIONS 9-10: 0
HOW OFTEN DO YOU HAVE A DRINK CONTAINING ALCOHOL: 2-4 TIMES A MONTH

## 2024-02-20 ASSESSMENT — PATIENT HEALTH QUESTIONNAIRE - PHQ9: CLINICAL INTERPRETATION OF PHQ2 SCORE: 0

## 2024-02-20 NOTE — PROGRESS NOTES
"Subjective:     CC:  Diagnoses of Encounter to establish care, Wellness examination, Hypertriglyceridemia, Uveitis, Family history of diabetes mellitus in father, Screening examination for sexually transmitted disease, and Need for hepatitis C screening test were pertinent to this visit.    HISTORY OF THE PRESENT ILLNESS: Patient is a 28 y.o. male. This pleasant patient is here today to establish care.    Hypertriglyceridemia  Chronic, patient reports he was previously on Tricor for elevated triglycerides. He reports the stopped taking the medication one year after starting it. He denies having side effects from the medication.    Uveitis  Patient was previously established with ophthalmologist. He last saw his eye doctor about two years ago. He states that when he does not wear his glasses he will have some haziness in his vision.     Patient Active Problem List    Diagnosis Date Noted    Uveitis 05/18/2021    Abnormal CBC 12/16/2020    Vitamin D deficiency 08/11/2020    Hypertriglyceridemia 10/18/2019    Class 3 severe obesity due to excess calories without serious comorbidity with body mass index (BMI) of 40.0 to 44.9 in adult ([facility]) 10/17/2019       Health Maintenance: Completed    ROS:   Review of Systems   Constitutional:  Negative for chills and fever.   Respiratory:  Negative for shortness of breath.    Cardiovascular:  Negative for chest pain and palpitations.         Objective:     Exam: BP (!) 140/80 (BP Location: Right arm, Patient Position: Sitting, BP Cuff Size: Adult)   Pulse 80   Temp 35.8 °C (96.5 °F) (Temporal)   Ht 1.702 m (5' 7\")   Wt 120 kg (264 lb)   SpO2 97%  Body mass index is 41.35 kg/m².    Physical Exam  Constitutional:       Appearance: Normal appearance.   Cardiovascular:      Rate and Rhythm: Normal rate and regular rhythm.   Pulmonary:      Effort: Pulmonary effort is normal. No respiratory distress.      Breath sounds: Normal breath sounds. No stridor. No wheezing or rhonchi. "   Neurological:      Mental Status: He is alert.             Assessment & Plan:   28 y.o. male with the following -    1. Encounter to establish care  2. Wellness examination  Patient presents today to establish care. Chart was reviewed and history was discussed in detail with the patient. Patient reports that he received influenza vaccination through work.  COVID vaccination was recommended however patient declined.  Annual lab orders provided.  - Comp Metabolic Panel; Future  - Lipid Profile; Future  - CBC WITH DIFFERENTIAL; Future    3. Hypertriglyceridemia  Chronic, stable condition.  Per chart review patient previously was on fenofibrate, patient states that he discontinued the medication 1 year after starting it.  Denies side effects from medication.  Will order lipid panel today.    4. Uveitis  Chronic, stable condition.  Per chart review patient previously evaluated by ophthalmologist and had blood work completed with normal findings.  Patient reports that he has not followed up with his eye doctor for the past 2 years.  Will refer patient to ophthalmology.  - Referral to Ophthalmology    5. Class 3 severe obesity due to excess calories without serious comorbidity with body mass index (BMI) of 40.0 to 44.9 in adult (HCC)  Chronic, stable condition.  BMI of 41.3.  Patient was advised to consume a low carb low saturated fat diet.  Patient was encouraged to engage in at least 30 minutes of physical activity daily.  Patient reports that he is getting  in October and him and his fiancée have started working out together and making lifestyle modifications.    6. Family history of diabetes mellitus in father  Patient reports family history of diabetes in father and paternal grandmother.  Will obtain fasting CMP today.    7. Screening examination for sexually transmitted disease  Requesting STD labs.  - HIV AG/AB COMBO ASSAY SCREENING; Future  - T.PALLIDUM AB JAD (SCREENING); Future  - Chlamydia/GC, PCR  (Urine); Future    8. Need for hepatitis C screening test  - HEP C VIRUS ANTIBODY; Future        Return in about 2 weeks (around 3/5/2024) for Labs.    Please note that this dictation was created using voice recognition software. I have made every reasonable attempt to correct obvious errors, but I expect that there are errors of grammar and possibly content that I did not discover before finalizing the note.

## 2024-02-20 NOTE — ASSESSMENT & PLAN NOTE
Chronic, patient reports he was previously on Tricor for elevated triglycerides. He reports the stopped taking the medication one year after starting it. He denies having side effects from the medication.

## 2024-02-20 NOTE — ASSESSMENT & PLAN NOTE
Patient was previously established with ophthalmologist. He last saw his eye doctor about two years ago. He states that when he does not wear his glasses he will have some haziness in his vision.

## 2024-02-21 LAB
C TRACH DNA SPEC QL NAA+PROBE: NEGATIVE
N GONORRHOEA DNA SPEC QL NAA+PROBE: NEGATIVE
SPECIMEN SOURCE: NORMAL

## 2024-02-28 ENCOUNTER — TELEMEDICINE (OUTPATIENT)
Dept: MEDICAL GROUP | Facility: PHYSICIAN GROUP | Age: 29
End: 2024-02-28
Payer: COMMERCIAL

## 2024-02-28 VITALS — OXYGEN SATURATION: 98 % | HEIGHT: 67 IN | BODY MASS INDEX: 41.44 KG/M2 | WEIGHT: 264 LBS

## 2024-02-28 DIAGNOSIS — E66.01 CLASS 3 SEVERE OBESITY DUE TO EXCESS CALORIES WITHOUT SERIOUS COMORBIDITY WITH BODY MASS INDEX (BMI) OF 40.0 TO 44.9 IN ADULT (HCC): ICD-10-CM

## 2024-02-28 DIAGNOSIS — R79.89 ABNORMAL CBC: ICD-10-CM

## 2024-02-28 DIAGNOSIS — R73.01 ELEVATED FASTING BLOOD SUGAR: ICD-10-CM

## 2024-02-28 DIAGNOSIS — E78.1 HYPERTRIGLYCERIDEMIA: ICD-10-CM

## 2024-02-28 PROCEDURE — 99214 OFFICE O/P EST MOD 30 MIN: CPT | Mod: 95 | Performed by: STUDENT IN AN ORGANIZED HEALTH CARE EDUCATION/TRAINING PROGRAM

## 2024-02-28 ASSESSMENT — FIBROSIS 4 INDEX: FIB4 SCORE: 0.67

## 2024-02-29 NOTE — PROGRESS NOTES
"Virtual Visit: Established Patient   This visit was conducted via Zoom using secure and encrypted videoconferencing technology.   The patient was in their home in the state of [place].    The patient's identity was confirmed and verbal consent was obtained for this virtual visit.    Subjective:   CC:   Chief Complaint   Patient presents with    Lab Results     Atul Burroughs is a 28 y.o. male presenting for evaluation and management of:    Presents today for lab follow-up.  Patient had questions regarding of when he should start getting colonoscopies.    ROS   Negative except as noted above    Current medicines (including changes today)  No current outpatient medications on file.     No current facility-administered medications for this visit.       Patient Active Problem List    Diagnosis Date Noted    Uveitis 05/18/2021    Abnormal CBC 12/16/2020    Vitamin D deficiency 08/11/2020    Hypertriglyceridemia 10/18/2019    Class 3 severe obesity due to excess calories without serious comorbidity with body mass index (BMI) of 40.0 to 44.9 in adult ([facility]) 10/17/2019        Objective:   Ht 1.702 m (5' 7\")   Wt 120 kg (264 lb)   SpO2 98%   BMI 41.35 kg/m²     Physical Exam:  Constitutional: Alert, no distress, well-groomed.  Skin: No rashes in visible areas.  Eye: Round. Conjunctiva clear, lids normal. No icterus.   ENMT: Lips pink without lesions, good dentition, moist mucous membranes. Phonation normal.  Neck: No masses, no thyromegaly. Moves freely without pain.  Respiratory: Unlabored respiratory effort, no cough or audible wheeze  Psych: Alert and oriented x3, normal affect and mood.        Latest Reference Range & Units 02/20/24 08:31   WBC 4.8 - 10.8 K/uL 8.6   RBC 4.70 - 6.10 M/uL 6.08   Hemoglobin 14.0 - 18.0 g/dL 16.6   Hematocrit 42.0 - 52.0 % 51.1   MCV 81.4 - 97.8 fL 84.0   MCH 27.0 - 33.0 pg 27.3   MCHC 32.3 - 36.5 g/dL 32.5   RDW 35.9 - 50.0 fL 42.2   Platelet Count 164 - 446 K/uL 222   MPV 9.0 - " 12.9 fL 11.7   Neutrophils-Polys 44.00 - 72.00 % 67.60   Neutrophils (Absolute) 1.82 - 7.42 K/uL 5.83   Lymphocytes 22.00 - 41.00 % 20.60 (L)   Lymphs (Absolute) 1.00 - 4.80 K/uL 1.78   Monocytes 0.00 - 13.40 % 5.90   Monos (Absolute) 0.00 - 0.85 K/uL 0.51   Eosinophils 0.00 - 6.90 % 3.20   Eos (Absolute) 0.00 - 0.51 K/uL 0.28   Basophils 0.00 - 1.80 % 1.30   Baso (Absolute) 0.00 - 0.12 K/uL 0.11   Immature Granulocytes 0.00 - 0.90 % 1.40 (H)   Immature Granulocytes (abs) 0.00 - 0.11 K/uL 0.12 (H)   Nucleated RBC 0.00 - 0.20 /100 WBC 0.00   NRBC (Absolute) K/uL 0.00   Sodium 135 - 145 mmol/L 139   Potassium 3.6 - 5.5 mmol/L 4.1   Chloride 96 - 112 mmol/L 102   Co2 20 - 33 mmol/L 24   Anion Gap 7.0 - 16.0  13.0   Glucose 65 - 99 mg/dL 108 (H)   Bun 8 - 22 mg/dL 14   Creatinine 0.50 - 1.40 mg/dL 1.01   GFR (CKD-EPI) >60 mL/min/1.73 m 2 103   Calcium 8.5 - 10.5 mg/dL 9.2   Correct Calcium 8.5 - 10.5 mg/dL 9.2   AST(SGOT) 12 - 45 U/L 31   ALT(SGPT) 2 - 50 U/L 36   Alkaline Phosphatase 30 - 99 U/L 82   Total Bilirubin 0.1 - 1.5 mg/dL 0.3   Albumin 3.2 - 4.9 g/dL 4.0   Total Protein 6.0 - 8.2 g/dL 7.6   Globulin 1.9 - 3.5 g/dL 3.6 (H)   A-G Ratio g/dL 1.1   Fasting Status  Fasting   Cholesterol,Tot 100 - 199 mg/dL 139   Triglycerides 0 - 149 mg/dL 188 (H)   HDL >=40 mg/dL 35 !   LDL <100 mg/dL 66   Hepatitis C Antibody Non-Reactive  Non-Reactive   HIV Ag/Ab Combo Assay Non Reactive  Non-Reactive   Syphilis, Treponemal Qual Non-Reactive  Non-Reactive   C. trachomatis by PCR Negative  Negative   Gc By Dna Probe Negative  Negative   Source  Urine   (L): Data is abnormally low  (H): Data is abnormally high  !: Data is abnormal  Assessment and Plan:   The following treatment plan was discussed:     1. Hypertriglyceridemia  Chronic, stable condition.  Triglycerides from 2/2024 elevated at 188, this has down trended from previous lipid panel.  Patient has been off of medication for 2-1/2 years.  At this time we will have patient  continue to watch his diet and exercise.  We will repeat lipid panel again in 1 year, if triglycerides are further elevated will consider restarting patient on fenofibrate.    2. Abnormal CBC  Chronic, stable.  CBC significant for low lymphocytes and elevated granulocytes.  Will continue to monitor these.  Hemoglobin, hematocrit, platelet levels, WBCs all normal.  Will repeat again in 6 months to a year.    3. Elevated fasting blood sugar  CMP from 2/2024 with elevated fasting blood glucose level.  Patient was advised to watch his diet and exercise.    4. Class 3 severe obesity due to excess calories without serious comorbidity with body mass index (BMI) of 40.0 to 44.9 in adult (HCC)  Chronic, stable condition.  Patient was advised to limit carb intake and saturated fats.  He was advised to engage in at least 30 minutes of physical activity daily.      Follow-up: Return if symptoms worsen or fail to improve.

## 2024-03-13 ENCOUNTER — PATIENT MESSAGE (OUTPATIENT)
Dept: MEDICAL GROUP | Facility: PHYSICIAN GROUP | Age: 29
End: 2024-03-13
Payer: COMMERCIAL

## 2024-03-13 DIAGNOSIS — K64.9 HEMORRHOIDS, UNSPECIFIED HEMORRHOID TYPE: ICD-10-CM

## 2024-03-21 ENCOUNTER — PATIENT MESSAGE (OUTPATIENT)
Dept: MEDICAL GROUP | Facility: PHYSICIAN GROUP | Age: 29
End: 2024-03-21
Payer: COMMERCIAL

## 2024-03-21 DIAGNOSIS — K64.9 HEMORRHOIDS, UNSPECIFIED HEMORRHOID TYPE: ICD-10-CM

## 2024-03-21 RX ORDER — HYDROCORTISONE ACETATE 25 MG/1
25 SUPPOSITORY RECTAL EVERY 12 HOURS
Qty: 14 SUPPOSITORY | Refills: 0 | Status: SHIPPED | OUTPATIENT
Start: 2024-03-21 | End: 2024-03-28

## 2024-07-15 ENCOUNTER — NON-PROVIDER VISIT (OUTPATIENT)
Dept: OCCUPATIONAL MEDICINE | Facility: CLINIC | Age: 29
End: 2024-07-15

## 2024-07-15 DIAGNOSIS — Z11.1 ENCOUNTER FOR PPD TEST: Primary | ICD-10-CM

## 2024-07-15 PROCEDURE — 86580 TB INTRADERMAL TEST: CPT

## 2024-07-17 ENCOUNTER — NON-PROVIDER VISIT (OUTPATIENT)
Dept: OCCUPATIONAL MEDICINE | Facility: CLINIC | Age: 29
End: 2024-07-17

## 2024-07-17 LAB — TB WHEAL 3D P 5 TU DIAM: NORMAL MM

## 2025-04-06 ENCOUNTER — PATIENT MESSAGE (OUTPATIENT)
Dept: MEDICAL GROUP | Facility: PHYSICIAN GROUP | Age: 30
End: 2025-04-06
Payer: COMMERCIAL

## 2025-04-06 DIAGNOSIS — K64.9 HEMORRHOIDS, UNSPECIFIED HEMORRHOID TYPE: ICD-10-CM

## 2025-04-07 RX ORDER — HYDROCORTISONE ACETATE 25 MG/1
25 SUPPOSITORY RECTAL EVERY 12 HOURS
Qty: 100 SUPPOSITORY | Refills: 3 | Status: SHIPPED | OUTPATIENT
Start: 2025-04-07 | End: 2025-04-09 | Stop reason: SDUPTHER

## 2025-04-08 ENCOUNTER — PATIENT MESSAGE (OUTPATIENT)
Dept: MEDICAL GROUP | Facility: PHYSICIAN GROUP | Age: 30
End: 2025-04-08
Payer: COMMERCIAL

## 2025-04-08 DIAGNOSIS — K64.9 HEMORRHOIDS, UNSPECIFIED HEMORRHOID TYPE: ICD-10-CM

## 2025-04-09 RX ORDER — HYDROCORTISONE ACETATE 25 MG/1
25 SUPPOSITORY RECTAL EVERY 12 HOURS
Qty: 100 SUPPOSITORY | Refills: 0 | Status: SHIPPED | OUTPATIENT
Start: 2025-04-09 | End: 2025-04-10

## 2025-04-10 DIAGNOSIS — K64.9 HEMORRHOIDS, UNSPECIFIED HEMORRHOID TYPE: ICD-10-CM

## 2025-04-10 RX ORDER — HYDROCORTISONE ACETATE 25 MG/1
25 SUPPOSITORY RECTAL EVERY 12 HOURS
Qty: 12 SUPPOSITORY | Refills: 0 | Status: SHIPPED | OUTPATIENT
Start: 2025-04-10

## 2025-05-28 DIAGNOSIS — K64.9 HEMORRHOIDS, UNSPECIFIED HEMORRHOID TYPE: ICD-10-CM

## 2025-05-29 RX ORDER — HYDROCORTISONE ACETATE 25 MG/1
25 SUPPOSITORY RECTAL EVERY 12 HOURS
Qty: 12 SUPPOSITORY | Refills: 0 | Status: SHIPPED | OUTPATIENT
Start: 2025-05-29

## 2025-07-31 ENCOUNTER — HOSPITAL ENCOUNTER (OUTPATIENT)
Dept: LAB | Facility: MEDICAL CENTER | Age: 30
End: 2025-07-31
Attending: OBSTETRICS & GYNECOLOGY
Payer: COMMERCIAL

## 2025-07-31 LAB
HBV SURFACE AB SERPL IA-ACNC: 3901 MIU/ML (ref 0–10)
HBV SURFACE AG SER QL: NORMAL
HCV AB SER QL: NORMAL
HIV 1+2 AB+HIV1 P24 AG SERPL QL IA: NORMAL
T PALLIDUM AB SER QL IA: NORMAL

## 2025-07-31 PROCEDURE — 87389 HIV-1 AG W/HIV-1&-2 AB AG IA: CPT

## 2025-07-31 PROCEDURE — 87591 N.GONORRHOEAE DNA AMP PROB: CPT

## 2025-07-31 PROCEDURE — 36415 COLL VENOUS BLD VENIPUNCTURE: CPT

## 2025-07-31 PROCEDURE — 86780 TREPONEMA PALLIDUM: CPT

## 2025-07-31 PROCEDURE — 87340 HEPATITIS B SURFACE AG IA: CPT

## 2025-07-31 PROCEDURE — 86706 HEP B SURFACE ANTIBODY: CPT

## 2025-07-31 PROCEDURE — 86803 HEPATITIS C AB TEST: CPT

## 2025-07-31 PROCEDURE — 87491 CHLMYD TRACH DNA AMP PROBE: CPT
